# Patient Record
Sex: MALE | Race: WHITE | Employment: FULL TIME | URBAN - METROPOLITAN AREA
[De-identification: names, ages, dates, MRNs, and addresses within clinical notes are randomized per-mention and may not be internally consistent; named-entity substitution may affect disease eponyms.]

---

## 2019-06-07 PROBLEM — K42.9 UMBILICAL HERNIA WITHOUT OBSTRUCTION OR GANGRENE: Status: ACTIVE | Noted: 2019-06-07

## 2019-06-18 PROBLEM — Z09 S/P UMBILICAL HERNIA REPAIR, FOLLOW-UP EXAM: Status: ACTIVE | Noted: 2019-06-18

## 2019-07-18 PROBLEM — Z09 S/P UMBILICAL HERNIA REPAIR, FOLLOW-UP EXAM: Status: RESOLVED | Noted: 2019-06-18 | Resolved: 2019-07-18

## 2019-07-26 PROBLEM — G89.18 POST-OP PAIN: Status: ACTIVE | Noted: 2019-07-26

## 2019-08-17 PROBLEM — K21.9 GASTROESOPHAGEAL REFLUX DISEASE WITHOUT ESOPHAGITIS: Status: ACTIVE | Noted: 2019-08-17

## 2019-08-19 ENCOUNTER — HOSPITAL ENCOUNTER (EMERGENCY)
Age: 29
Discharge: HOME | End: 2019-08-20
Payer: COMMERCIAL

## 2019-08-19 DIAGNOSIS — R10.32 LEFT LOWER QUADRANT PAIN: Primary | ICD-10-CM

## 2019-08-19 DIAGNOSIS — R11.2 NAUSEA VOMITING AND DIARRHEA: ICD-10-CM

## 2019-08-19 DIAGNOSIS — R19.7 NAUSEA VOMITING AND DIARRHEA: ICD-10-CM

## 2019-08-19 LAB
ALBUMIN SERPL-MCNC: 4.5 GM/DL (ref 3.4–5)
ALP BLD-CCNC: 71 IU/L (ref 40–129)
ALT SERPL-CCNC: 42 U/L (ref 10–40)
ANION GAP SERPL CALCULATED.3IONS-SCNC: 12 MMOL/L (ref 4–16)
AST SERPL-CCNC: 28 IU/L (ref 15–37)
BASOPHILS ABSOLUTE: 0 K/CU MM
BASOPHILS RELATIVE PERCENT: 0.4 % (ref 0–1)
BILIRUB SERPL-MCNC: 0.3 MG/DL (ref 0–1)
BUN BLDV-MCNC: 15 MG/DL (ref 6–23)
CALCIUM SERPL-MCNC: 9.8 MG/DL (ref 8.3–10.6)
CHLORIDE BLD-SCNC: 98 MMOL/L (ref 99–110)
CO2: 25 MMOL/L (ref 21–32)
CREAT SERPL-MCNC: 0.9 MG/DL (ref 0.9–1.3)
DIFFERENTIAL TYPE: ABNORMAL
EOSINOPHILS ABSOLUTE: 0.1 K/CU MM
EOSINOPHILS RELATIVE PERCENT: 0.5 % (ref 0–3)
GFR AFRICAN AMERICAN: >60 ML/MIN/1.73M2
GFR NON-AFRICAN AMERICAN: >60 ML/MIN/1.73M2
GLUCOSE BLD-MCNC: 99 MG/DL (ref 70–99)
HCT VFR BLD CALC: 46.5 % (ref 42–52)
HEMOGLOBIN: 15.1 GM/DL (ref 13.5–18)
IMMATURE NEUTROPHIL %: 0.3 % (ref 0–0.43)
LIPASE: 26 IU/L (ref 13–60)
LYMPHOCYTES ABSOLUTE: 2 K/CU MM
LYMPHOCYTES RELATIVE PERCENT: 20.3 % (ref 24–44)
MCH RBC QN AUTO: 31.3 PG (ref 27–31)
MCHC RBC AUTO-ENTMCNC: 32.5 % (ref 32–36)
MCV RBC AUTO: 96.3 FL (ref 78–100)
MONOCYTES ABSOLUTE: 0.7 K/CU MM
MONOCYTES RELATIVE PERCENT: 7.5 % (ref 0–4)
NUCLEATED RBC %: 0 %
PDW BLD-RTO: 13 % (ref 11.7–14.9)
PLATELET # BLD: 242 K/CU MM (ref 140–440)
PMV BLD AUTO: 10.8 FL (ref 7.5–11.1)
POTASSIUM SERPL-SCNC: 4.2 MMOL/L (ref 3.5–5.1)
RBC # BLD: 4.83 M/CU MM (ref 4.6–6.2)
SEGMENTED NEUTROPHILS ABSOLUTE COUNT: 6.9 K/CU MM
SEGMENTED NEUTROPHILS RELATIVE PERCENT: 71 % (ref 36–66)
SODIUM BLD-SCNC: 135 MMOL/L (ref 135–145)
TOTAL IMMATURE NEUTOROPHIL: 0.03 K/CU MM
TOTAL NUCLEATED RBC: 0 K/CU MM
TOTAL PROTEIN: 7.2 GM/DL (ref 6.4–8.2)
WBC # BLD: 9.7 K/CU MM (ref 4–10.5)

## 2019-08-19 PROCEDURE — 36415 COLL VENOUS BLD VENIPUNCTURE: CPT

## 2019-08-19 PROCEDURE — 80053 COMPREHEN METABOLIC PANEL: CPT

## 2019-08-19 PROCEDURE — 83690 ASSAY OF LIPASE: CPT

## 2019-08-19 PROCEDURE — 85025 COMPLETE CBC W/AUTO DIFF WBC: CPT

## 2019-08-19 PROCEDURE — 99283 EMERGENCY DEPT VISIT LOW MDM: CPT

## 2019-08-19 PROCEDURE — 6370000000 HC RX 637 (ALT 250 FOR IP): Performed by: PHYSICIAN ASSISTANT

## 2019-08-19 RX ORDER — DICYCLOMINE HCL 20 MG
20 TABLET ORAL ONCE
Status: COMPLETED | OUTPATIENT
Start: 2019-08-19 | End: 2019-08-19

## 2019-08-19 RX ORDER — ONDANSETRON 4 MG/1
4 TABLET, ORALLY DISINTEGRATING ORAL EVERY 6 HOURS
Qty: 10 TABLET | Refills: 0 | Status: SHIPPED | OUTPATIENT
Start: 2019-08-19 | End: 2019-10-10

## 2019-08-19 RX ORDER — PROMETHAZINE HYDROCHLORIDE 25 MG/1
25 TABLET ORAL EVERY 6 HOURS PRN
Qty: 12 TABLET | Refills: 0 | Status: SHIPPED | OUTPATIENT
Start: 2019-08-19

## 2019-08-19 RX ORDER — ONDANSETRON 4 MG/1
4 TABLET, ORALLY DISINTEGRATING ORAL ONCE
Status: COMPLETED | OUTPATIENT
Start: 2019-08-19 | End: 2019-08-19

## 2019-08-19 RX ORDER — HYDROCODONE BITARTRATE AND ACETAMINOPHEN 5; 325 MG/1; MG/1
1 TABLET ORAL EVERY 6 HOURS PRN
Qty: 5 TABLET | Refills: 0 | Status: SHIPPED | OUTPATIENT
Start: 2019-08-19 | End: 2019-08-21

## 2019-08-19 RX ORDER — DICYCLOMINE HYDROCHLORIDE 10 MG/1
20 CAPSULE ORAL 4 TIMES DAILY PRN
Qty: 30 CAPSULE | Refills: 0 | Status: SHIPPED | OUTPATIENT
Start: 2019-08-19 | End: 2019-08-21 | Stop reason: SDUPTHER

## 2019-08-19 RX ADMIN — ONDANSETRON 4 MG: 4 TABLET, ORALLY DISINTEGRATING ORAL at 23:15

## 2019-08-19 RX ADMIN — DICYCLOMINE HYDROCHLORIDE 20 MG: 20 TABLET ORAL at 23:15

## 2019-08-19 ASSESSMENT — PAIN DESCRIPTION - PAIN TYPE: TYPE: ACUTE PAIN

## 2019-08-19 ASSESSMENT — PAIN DESCRIPTION - ORIENTATION: ORIENTATION: LEFT

## 2019-08-19 ASSESSMENT — PAIN DESCRIPTION - LOCATION: LOCATION: ABDOMEN

## 2019-08-19 ASSESSMENT — PAIN SCALES - GENERAL: PAINLEVEL_OUTOF10: 8

## 2019-08-20 VITALS
HEART RATE: 96 BPM | RESPIRATION RATE: 18 BRPM | OXYGEN SATURATION: 98 % | HEIGHT: 70 IN | TEMPERATURE: 98.2 F | BODY MASS INDEX: 29.49 KG/M2 | WEIGHT: 206 LBS | SYSTOLIC BLOOD PRESSURE: 128 MMHG | DIASTOLIC BLOOD PRESSURE: 91 MMHG

## 2019-08-20 NOTE — ED PROVIDER NOTES
Past Medical History:   Diagnosis Date    Reflux      Past Surgical History:   Procedure Laterality Date    HERNIA REPAIR      umbilical hernia repair (June 2019)  Dr. Ana Tran    Current Outpatient Rx   Medication Sig Dispense Refill    LORazepam (ATIVAN) 0.5 MG tablet TAKE 1-2 TABLETS BY MOUTH EVERY 8 HOURS AS NEEDED  2    pantoprazole sodium (PROTONIX) 40 MG PACK packet Take 40 mg by mouth every morning (before breakfast)      acetaminophen (TYLENOL) 500 MG tablet Take 1,000 mg by mouth every 6 hours as needed for Pain. ALLERGIES    No Known Allergies    SOCIAL AND FAMILY HISTORY    Social History     Socioeconomic History    Marital status: Single     Spouse name: None    Number of children: None    Years of education: None    Highest education level: None   Occupational History    None   Social Needs    Financial resource strain: None    Food insecurity:     Worry: None     Inability: None    Transportation needs:     Medical: None     Non-medical: None   Tobacco Use    Smoking status: Former Smoker     Packs/day: 0.50     Types: Cigarettes    Smokeless tobacco: Never Used    Tobacco comment: VAPE   Substance and Sexual Activity    Alcohol use: No    Drug use: No    Sexual activity: None   Lifestyle    Physical activity:     Days per week: None     Minutes per session: None    Stress: None   Relationships    Social connections:     Talks on phone: None     Gets together: None     Attends [employer] service: None     Active member of club or organization: None     Attends meetings of clubs or organizations: None     Relationship status: None    Intimate partner violence:     Fear of current or ex partner: None     Emotionally abused: None     Physically abused: None     Forced sexual activity: None   Other Topics Concern    None   Social History Narrative    None     History reviewed. No pertinent family history.     PHYSICAL EXAM    VITAL SIGNS: BP (!) 129/93   Pulse 116   Temp 98.2 °F (36.8 °C) (Oral)   Resp 18   Ht 5' 10\" (1.778 m)   Wt 206 lb (93.4 kg)   SpO2 98%   BMI 29.56 kg/m²   Constitutional:  Well developed, well nourished. No distress  Eyes:  Sclera nonicteric, conjunctiva moist  HENT:  Atraumatic. PERRL. EOMI.  moist mucus membranes. Neck/Lymphatics: supple, no JVD, no swollen nodes  Respiratory:  No retractions, no accessory muscle use, normal breath sounds   Cardiovascular:  Mild tachy rate, normal rhythm, no murmurs    GI:     No gross discoloration.       -no Landers's sign (periumbilical ecchymosis)       -no Grey-Archuleta's sign (flank ecchymosis)  (necrosis/hemmorrhage may cause subcutaneous blood leakage)    Bowel sounds present, no audible bruits. Soft,  No distention, no guarding, no rigidity,   + mild generalized lower and left quadrant abdominal tenderness, no rebound, no palpable pulsatile masses,  No focal or peritoneal findings   No McBurney's point tenderness   Negative Rovsing sign    Negative Gallo's sign. Back:   No CVA tenderness to percussion.   Musculoskeletal:  No edema, no deformity  Vascular: DP pulses 2+ equal bilaterally  Integument: No rash, dry skin  Neurologic:  Alert & oriented, normal speech  Psychiatric: Cooperative, pleasant affect       LABS:  Results for orders placed or performed during the hospital encounter of 08/19/19   CBC auto diff   Result Value Ref Range    WBC 9.7 4.0 - 10.5 K/CU MM    RBC 4.83 4.6 - 6.2 M/CU MM    Hemoglobin 15.1 13.5 - 18.0 GM/DL    Hematocrit 46.5 42 - 52 %    MCV 96.3 78 - 100 FL    MCH 31.3 (H) 27 - 31 PG    MCHC 32.5 32.0 - 36.0 %    RDW 13.0 11.7 - 14.9 %    Platelets 221 592 - 755 K/CU MM    MPV 10.8 7.5 - 11.1 FL    Differential Type AUTOMATED DIFFERENTIAL     Segs Relative 71.0 (H) 36 - 66 %    Lymphocytes % 20.3 (L) 24 - 44 %    Monocytes % 7.5 (H) 0 - 4 %    Eosinophils % 0.5 0 - 3 %    Basophils % 0.4 0 - 1 %    Segs Absolute 6.9 K/CU MM Lymphocytes # 2.0 K/CU MM    Monocytes # 0.7 K/CU MM    Eosinophils # 0.1 K/CU MM    Basophils # 0.0 K/CU MM    Nucleated RBC % 0.0 %    Total Nucleated RBC 0.0 K/CU MM    Total Immature Neutrophil 0.03 K/CU MM    Immature Neutrophil % 0.3 0 - 0.43 %   CMP   Result Value Ref Range    Sodium 135 135 - 145 MMOL/L    Potassium 4.2 3.5 - 5.1 MMOL/L    Chloride 98 (L) 99 - 110 mMol/L    CO2 25 21 - 32 MMOL/L    BUN 15 6 - 23 MG/DL    CREATININE 0.9 0.9 - 1.3 MG/DL    Glucose 99 70 - 99 MG/DL    Calcium 9.8 8.3 - 10.6 MG/DL    Alb 4.5 3.4 - 5.0 GM/DL    Total Protein 7.2 6.4 - 8.2 GM/DL    Total Bilirubin 0.3 0.0 - 1.0 MG/DL    ALT 42 (H) 10 - 40 U/L    AST 28 15 - 37 IU/L    Alkaline Phosphatase 71 40 - 129 IU/L    GFR Non-African American >60 >60 mL/min/1.73m2    GFR African American >60 >60 mL/min/1.73m2    Anion Gap 12 4 - 16   Lipase   Result Value Ref Range    Lipase 26 13 - 60 IU/L         ED COURSE & MEDICAL DECISION MAKING       Vital signs and nursing notes reviewed during ED course. Patient seen independently. Attending available throughout ED stay for consultation. All pertinent Lab data and radiographic results reviewed with patient at bedside. The patient and/or the family were informed of the results of any tests/labs/imaging, the treatment plan, and time was allotted to answer questions. Differential diagnosis: Abdominal Aortic Aneurysm, Ischemic Bowel, Bowel Obstruction, Acute Cholecystitis, Acute Appendicitis, other    Clinical  IMPRESSION    1. Left lower quadrant pain    2. Nausea vomiting and diarrhea        Patient presents as above. Has had continued abdominal pain since June. He has new vomiting and diarrhea over the last 4 days. He is also taking ciprofloxacin for possible colitis that was seen on an outpatient CT 2 Fridays ago.   Under patient's chart review I do find the following impression from a CAT scan from [facility] family medicine:  IMPRESSION:          Mild mucosal

## 2019-08-21 ENCOUNTER — APPOINTMENT (OUTPATIENT)
Dept: CT IMAGING | Age: 29
End: 2019-08-21
Payer: COMMERCIAL

## 2019-08-21 ENCOUNTER — HOSPITAL ENCOUNTER (EMERGENCY)
Age: 29
Discharge: HOME | End: 2019-08-21
Attending: EMERGENCY MEDICINE
Payer: COMMERCIAL

## 2019-08-21 VITALS
HEART RATE: 98 BPM | RESPIRATION RATE: 18 BRPM | OXYGEN SATURATION: 97 % | BODY MASS INDEX: 27.92 KG/M2 | SYSTOLIC BLOOD PRESSURE: 124 MMHG | WEIGHT: 195 LBS | DIASTOLIC BLOOD PRESSURE: 94 MMHG | HEIGHT: 70 IN | TEMPERATURE: 98.2 F

## 2019-08-21 DIAGNOSIS — R11.2 NON-INTRACTABLE VOMITING WITH NAUSEA, UNSPECIFIED VOMITING TYPE: ICD-10-CM

## 2019-08-21 DIAGNOSIS — R19.7 DIARRHEA, UNSPECIFIED TYPE: ICD-10-CM

## 2019-08-21 DIAGNOSIS — R10.32 ABDOMINAL PAIN, LEFT LOWER QUADRANT: Primary | ICD-10-CM

## 2019-08-21 LAB
ALBUMIN SERPL-MCNC: 4.2 GM/DL (ref 3.4–5)
ALP BLD-CCNC: 64 IU/L (ref 40–129)
ALT SERPL-CCNC: 42 U/L (ref 10–40)
ANION GAP SERPL CALCULATED.3IONS-SCNC: 12 MMOL/L (ref 4–16)
AST SERPL-CCNC: 25 IU/L (ref 15–37)
BASOPHILS ABSOLUTE: 0 K/CU MM
BASOPHILS RELATIVE PERCENT: 0.6 % (ref 0–1)
BILIRUB SERPL-MCNC: 0.3 MG/DL (ref 0–1)
BUN BLDV-MCNC: 13 MG/DL (ref 6–23)
CALCIUM SERPL-MCNC: 9 MG/DL (ref 8.3–10.6)
CHLORIDE BLD-SCNC: 104 MMOL/L (ref 99–110)
CO2: 24 MMOL/L (ref 21–32)
CREAT SERPL-MCNC: 0.8 MG/DL (ref 0.9–1.3)
DIFFERENTIAL TYPE: ABNORMAL
EOSINOPHILS ABSOLUTE: 0.1 K/CU MM
EOSINOPHILS RELATIVE PERCENT: 1 % (ref 0–3)
GFR AFRICAN AMERICAN: >60 ML/MIN/1.73M2
GFR NON-AFRICAN AMERICAN: >60 ML/MIN/1.73M2
GLUCOSE BLD-MCNC: 103 MG/DL (ref 70–99)
HCT VFR BLD CALC: 43.1 % (ref 42–52)
HEMOGLOBIN: 14 GM/DL (ref 13.5–18)
IMMATURE NEUTROPHIL %: 0.8 % (ref 0–0.43)
LIPASE: 28 IU/L (ref 13–60)
LYMPHOCYTES ABSOLUTE: 1.7 K/CU MM
LYMPHOCYTES RELATIVE PERCENT: 32.9 % (ref 24–44)
MCH RBC QN AUTO: 31 PG (ref 27–31)
MCHC RBC AUTO-ENTMCNC: 32.5 % (ref 32–36)
MCV RBC AUTO: 95.6 FL (ref 78–100)
MONOCYTES ABSOLUTE: 0.4 K/CU MM
MONOCYTES RELATIVE PERCENT: 8.3 % (ref 0–4)
NUCLEATED RBC %: 0 %
PDW BLD-RTO: 13.2 % (ref 11.7–14.9)
PLATELET # BLD: 215 K/CU MM (ref 140–440)
PMV BLD AUTO: 10.3 FL (ref 7.5–11.1)
POTASSIUM SERPL-SCNC: 4.5 MMOL/L (ref 3.5–5.1)
RBC # BLD: 4.51 M/CU MM (ref 4.6–6.2)
SEGMENTED NEUTROPHILS ABSOLUTE COUNT: 2.9 K/CU MM
SEGMENTED NEUTROPHILS RELATIVE PERCENT: 56.4 % (ref 36–66)
SODIUM BLD-SCNC: 140 MMOL/L (ref 135–145)
TOTAL IMMATURE NEUTOROPHIL: 0.04 K/CU MM
TOTAL NUCLEATED RBC: 0 K/CU MM
TOTAL PROTEIN: 6.9 GM/DL (ref 6.4–8.2)
WBC # BLD: 5.2 K/CU MM (ref 4–10.5)

## 2019-08-21 PROCEDURE — 99284 EMERGENCY DEPT VISIT MOD MDM: CPT

## 2019-08-21 PROCEDURE — 80053 COMPREHEN METABOLIC PANEL: CPT

## 2019-08-21 PROCEDURE — 36415 COLL VENOUS BLD VENIPUNCTURE: CPT

## 2019-08-21 PROCEDURE — 85025 COMPLETE CBC W/AUTO DIFF WBC: CPT

## 2019-08-21 PROCEDURE — 6360000002 HC RX W HCPCS: Performed by: PHYSICIAN ASSISTANT

## 2019-08-21 PROCEDURE — 96374 THER/PROPH/DIAG INJ IV PUSH: CPT

## 2019-08-21 PROCEDURE — 83690 ASSAY OF LIPASE: CPT

## 2019-08-21 PROCEDURE — 2580000003 HC RX 258: Performed by: PHYSICIAN ASSISTANT

## 2019-08-21 PROCEDURE — 6370000000 HC RX 637 (ALT 250 FOR IP): Performed by: PHYSICIAN ASSISTANT

## 2019-08-21 PROCEDURE — 96375 TX/PRO/DX INJ NEW DRUG ADDON: CPT

## 2019-08-21 RX ORDER — DICYCLOMINE HYDROCHLORIDE 10 MG/1
20 CAPSULE ORAL 4 TIMES DAILY PRN
Qty: 30 CAPSULE | Refills: 0 | Status: SHIPPED | OUTPATIENT
Start: 2019-08-21

## 2019-08-21 RX ORDER — SODIUM CHLORIDE 0.9 % (FLUSH) 0.9 %
10 SYRINGE (ML) INJECTION
Status: DISCONTINUED | OUTPATIENT
Start: 2019-08-21 | End: 2019-08-21 | Stop reason: HOSPADM

## 2019-08-21 RX ORDER — KETOROLAC TROMETHAMINE 30 MG/ML
15 INJECTION, SOLUTION INTRAMUSCULAR; INTRAVENOUS ONCE
Status: COMPLETED | OUTPATIENT
Start: 2019-08-21 | End: 2019-08-21

## 2019-08-21 RX ORDER — 0.9 % SODIUM CHLORIDE 0.9 %
1000 INTRAVENOUS SOLUTION INTRAVENOUS ONCE
Status: COMPLETED | OUTPATIENT
Start: 2019-08-21 | End: 2019-08-21

## 2019-08-21 RX ORDER — ONDANSETRON 2 MG/ML
4 INJECTION INTRAMUSCULAR; INTRAVENOUS ONCE
Status: COMPLETED | OUTPATIENT
Start: 2019-08-21 | End: 2019-08-21

## 2019-08-21 RX ADMIN — SODIUM CHLORIDE 1000 ML: 9 INJECTION, SOLUTION INTRAVENOUS at 15:57

## 2019-08-21 RX ADMIN — HYOSCYAMINE SULFATE 125 MCG: 0.12 TABLET ORAL; SUBLINGUAL at 15:57

## 2019-08-21 RX ADMIN — KETOROLAC TROMETHAMINE 15 MG: 30 INJECTION, SOLUTION INTRAMUSCULAR; INTRAVENOUS at 15:57

## 2019-08-21 RX ADMIN — ONDANSETRON 4 MG: 2 INJECTION INTRAMUSCULAR; INTRAVENOUS at 15:58

## 2019-08-21 ASSESSMENT — PAIN DESCRIPTION - PAIN TYPE: TYPE: ACUTE PAIN

## 2019-08-21 ASSESSMENT — PAIN DESCRIPTION - DESCRIPTORS: DESCRIPTORS: CRAMPING;DISCOMFORT;ACHING

## 2019-08-21 ASSESSMENT — PAIN DESCRIPTION - PROGRESSION: CLINICAL_PROGRESSION: NOT CHANGED

## 2019-08-21 ASSESSMENT — PAIN SCALES - GENERAL
PAINLEVEL_OUTOF10: 6
PAINLEVEL_OUTOF10: 6

## 2019-08-21 ASSESSMENT — PAIN DESCRIPTION - ONSET: ONSET: ON-GOING

## 2019-08-21 ASSESSMENT — PAIN DESCRIPTION - LOCATION: LOCATION: ABDOMEN

## 2019-08-21 ASSESSMENT — PAIN DESCRIPTION - FREQUENCY: FREQUENCY: INTERMITTENT

## 2019-08-21 NOTE — ED PROVIDER NOTES
eMERGENCY dEPARTMENT eNCOUnter      PCP: Anne Hernandez MD    CHIEF COMPLAINT    Chief Complaint   Patient presents with    Abdominal Pain     intermittent rated 6/10    Emesis     x 1 week    Nausea       HPI    Aileen Kirk is a 30 y.o. male with PSH of hernia repair in June 2019 by Dr. Sandra Zarco who presents with abdominal pain, nausea, vomiting, diarrhea. Onset was 1 week ago. Duration has been intermittent since the onset. Characteristic pain is described as burning and sharp. Patient reports associated subjective fever. Aggravating factors are moving from sitting to standing, going from standing to sitting, going from laying to sitting and mowing the lawn. Patient reports approximately 2 episodes of nonbloody, nonbilious emesis in the last 24 hours and multiple episodes of nonbloody diarrhea in the last 24 hours. Patient denies recent sick contacts and denies being around animals. Abdominal pain located in the left lower quadrant and hypogastric region. Abdominal pain does not radiate. Patient reports that he has tried Zofran and Phenergan as well as Norco with some improvement in symptoms. Patient has been able to keep down water and Pedialyte today but no solids. Patient reports recent CT scan showing \"infection in my colon\" on August 9th and patient reports that he was started on Cipro and Flagyl at that time. Patient reports he was seen in our ED 2 days ago and was treated with Phenergan, Zofran, and Norco.  Patient was also recently seen by his general surgeon, Dr. Matheus Hernández, on 8/14/2019 who referred him on to a GI specialist for further evaluation given patient's ongoing left lower quadrant abdominal pain since his surgery in June but patient has not yet heard from GI specialty regarding an appointment. Patient denies chest pain, shortness of breath, urinary symptoms, melena, hematochezia, hematemesis.     REVIEW OF SYSTEMS    Constitutional: + Subjective fever  HENT:  Denies >60 >60 mL/min/1.73m2    GFR African American >60 >60 mL/min/1.73m2    Anion Gap 12 4 - 16   Lipase   Result Value Ref Range    Lipase 28 13 - 60 IU/L                 ED COURSE & MEDICAL DECISION MAKING    Vital signs and nursing notes reviewed during ED course. Patient care and presentation staffed with and seen in conjunction with supervising physician, Dr. Adarsh Carrillo, today. Patient presents as above which prompted workup. Chart review of recent ED visit just a few days ago was performed today. Patient treated in the ED with IV fluids, IV Zofran, IV Toradol, and sublingual Levsin with improvement in nausea and resolution of tachycardia, but patient continues to have abdominal discomfort. Abdominal exam and repeat abdominal exam are without peritoneal signs. While in the ED today, labs were obtained. Labs without clinically significant derangements. No leukocytosis or leukopenia. Patient declines CT imaging of abdomen and pelvis while in the ED today reporting that since he had one on the ninth he does not want to be repeated because he is unsure if his insurance will pay for it or not. Patient reports that he feels like hydrocodone-acetaminophen is the only thing that helps his abdominal pain and request refill of this prescription. Patient and I discussed that I do not feel comfortable refilling this prescription as he is quite a ways out from surgery and feel this should come from either pain management or his PCP. Patient advised to follow-up with his general surgeon as needed and with GI physician at his scheduled appointment on 9/6/2019 for further evaluation of patient's pain. I have low clinical suspicion for emergent cause of patient's symptoms. All pertinent Lab data and radiographic results reviewed with patient at bedside. The patient and/or the family were informed of the results of any tests/labs/imaging, the treatment plan, and time was allotted to answer questions.    Diagnosis, disposition, and treatment plan reviewed with patient and/or family who understands and agrees. I estimate there is LOW risk for ACUTE APPENDICITIS, BOWEL OBSTRUCTION, CHOLECYSTITIS, RUPTURED DIVERTICULITIS, INCARCERATED HERNIA, HEMMORHAGIC PANCREATITIS, or PERFORATED BOWEL/ULCER, thus I consider the discharge disposition reasonable. Also, there is no evidence or peritonitis, sepsis, or toxicity. Roseanna Escobedos (or their surrogate) and I have discussed the diagnosis and risks, and we agree with discharging home with close follow-up. We also discussed returning to the Emergency Department immediately if new or worsening symptoms occur. We have discussed the symptoms which are most concerning that necessitate immediate return. Clinical  IMPRESSION    1. Abdominal pain, left lower quadrant    2. Non-intractable vomiting with nausea, unspecified vomiting type    3. Diarrhea, unspecified type        Disposition referral (if applicable):  Benedicto Greene MD  [facility] [id number] [facility]  [phone]    Call today  [address] in 2-3 days    Azar Bass MD  [address] [place] [address]  [phone]    Go to   Recheck at your scheduled appointment on 9/6/19    Titus Gomez MD  [address]  [address] [facility] [phone]    Call   As needed    [facility] Emergency Department  [facility] [phone]  Go to   Return to ED if symptoms worsen or new symptoms      Disposition medications (if applicable):  Discharge Medication List as of 8/21/2019  5:17 PM            Comment: Please note this report has been produced using speech recognition software and may contain errors related to that system including errors in grammar, punctuation, and spelling, as well as words and phrases that may be inappropriate.  If there are any questions or concerns please feel free to contact the dictating

## 2019-08-21 NOTE — ED PROVIDER NOTES
I independently examined and evaluated Soha Wiseman. In brief, patient with acute on chronic abdominal pain has seen general surgery and scheduled to see GI in a few weeks. Focused exam revealed diffuse tenderness no rebound guarding or rigidity. ED course: Labs sent results nonemergent patient declining imaging he will be discharged to continue outpatient work-up he understands and agrees return warnings given. All diagnostic, treatment, and disposition decisions were made by myself in conjunction with the advanced practice provider. For all further details of the patient's emergency department visit, please see the advanced practice provider's documentation. Comment: Please note this report has been produced using speech recognition software and may contain errors related to that system including errors in grammar, punctuation, and spelling, as well as words and phrases that may be inappropriate. If there are any questions or concerns please feel free to contact the dictating provider for clarification.         Sugey Vazquez MD  08/21/19 8882

## 2019-08-23 ENCOUNTER — HOSPITAL ENCOUNTER (EMERGENCY)
Age: 29
Discharge: HOME | End: 2019-08-23
Attending: EMERGENCY MEDICINE
Payer: COMMERCIAL

## 2019-08-23 VITALS
RESPIRATION RATE: 15 BRPM | HEART RATE: 97 BPM | SYSTOLIC BLOOD PRESSURE: 131 MMHG | OXYGEN SATURATION: 98 % | TEMPERATURE: 98.4 F | HEIGHT: 70 IN | DIASTOLIC BLOOD PRESSURE: 107 MMHG | WEIGHT: 200 LBS | BODY MASS INDEX: 28.63 KG/M2

## 2019-08-23 DIAGNOSIS — R11.2 NAUSEA VOMITING AND DIARRHEA: Primary | ICD-10-CM

## 2019-08-23 DIAGNOSIS — R10.32 LEFT LOWER QUADRANT PAIN: ICD-10-CM

## 2019-08-23 DIAGNOSIS — R19.7 NAUSEA VOMITING AND DIARRHEA: Primary | ICD-10-CM

## 2019-08-23 PROCEDURE — 99282 EMERGENCY DEPT VISIT SF MDM: CPT

## 2019-08-23 ASSESSMENT — PAIN SCALES - GENERAL: PAINLEVEL_OUTOF10: 5

## 2019-08-23 ASSESSMENT — PAIN DESCRIPTION - DESCRIPTORS: DESCRIPTORS: SHARP

## 2019-08-23 ASSESSMENT — PAIN DESCRIPTION - FREQUENCY: FREQUENCY: INTERMITTENT

## 2019-08-23 ASSESSMENT — PAIN DESCRIPTION - PAIN TYPE: TYPE: ACUTE PAIN

## 2019-08-23 ASSESSMENT — PAIN DESCRIPTION - LOCATION: LOCATION: ABDOMEN

## 2019-08-23 NOTE — ED PROVIDER NOTES
Smoker     Packs/day: 0.50     Types: Cigarettes    Smokeless tobacco: Never Used    Tobacco comment: VAPE   Substance and Sexual Activity    Alcohol use: No    Drug use: No    Sexual activity: Not on file   Lifestyle    Physical activity:     Days per week: Not on file     Minutes per session: Not on file    Stress: Not on file   Relationships    Social connections:     Talks on phone: Not on file     Gets together: Not on file     Attends Yazdanism service: Not on file     Active member of club or organization: Not on file     Attends meetings of clubs or organizations: Not on file     Relationship status: Not on file    Intimate partner violence:     Fear of current or ex partner: Not on file     Emotionally abused: Not on file     Physically abused: Not on file     Forced sexual activity: Not on file   Other Topics Concern    Not on file   Social History Narrative    Not on file     No current facility-administered medications for this encounter. Current Outpatient Medications   Medication Sig Dispense Refill    dicyclomine (BENTYL) 10 MG capsule Take 2 capsules by mouth 4 times daily as needed (abd pain) 30 capsule 0    ondansetron (ZOFRAN ODT) 4 MG disintegrating tablet Take 1 tablet by mouth every 6 hours 10 tablet 0    promethazine (PHENERGAN) 25 MG tablet Take 1 tablet by mouth every 6 hours as needed for Nausea 12 tablet 0    LORazepam (ATIVAN) 0.5 MG tablet TAKE 1-2 TABLETS BY MOUTH EVERY 8 HOURS AS NEEDED  2    pantoprazole sodium (PROTONIX) 40 MG PACK packet Take 40 mg by mouth every morning (before breakfast)      acetaminophen (TYLENOL) 500 MG tablet Take 1,000 mg by mouth every 6 hours as needed for Pain.        No Known Allergies    Nursing Notes Reviewed    Physical Exam:  ED Triage Vitals [08/23/19 1332]   Enc Vitals Group      BP (!) 143/98      Pulse 105      Resp 14      Temp 98.4 °F (36.9 °C)      Temp Source Oral      SpO2 97 %      Weight 200 lb (90.7 kg)      Height 5'

## 2019-08-25 PROBLEM — Z09 S/P UMBILICAL HERNIA REPAIR, FOLLOW-UP EXAM: Status: RESOLVED | Noted: 2019-06-18 | Resolved: 2019-08-25

## 2019-09-16 ENCOUNTER — HOSPITAL ENCOUNTER (OUTPATIENT)
Age: 29
Setting detail: SPECIMEN
Discharge: HOME | End: 2019-09-16
Payer: COMMERCIAL

## 2019-09-16 PROCEDURE — 83630 LACTOFERRIN FECAL (QUAL): CPT

## 2019-09-16 PROCEDURE — 87046 STOOL CULTR AEROBIC BACT EA: CPT

## 2019-09-16 PROCEDURE — 87209 SMEAR COMPLEX STAIN: CPT

## 2019-09-16 PROCEDURE — 87324 CLOSTRIDIUM AG IA: CPT

## 2019-09-16 PROCEDURE — 87045 FECES CULTURE AEROBIC BACT: CPT

## 2019-09-16 PROCEDURE — 87177 OVA AND PARASITES SMEARS: CPT

## 2019-09-17 LAB — LACTOFERRIN, QUAL: NEGATIVE

## 2019-09-18 LAB
CLOSTRIDIUM DIFFICILE, PCR: NORMAL
CLOSTRIDIUM DIFFICILE, PCR: NORMAL

## 2019-09-20 LAB
CULTURE: NORMAL
Lab: NORMAL
SPECIMEN: NORMAL

## 2019-09-21 LAB
OVA AND PARASITE WET MOUNT: NEGATIVE
OVA AND PARASITE WET MOUNT: NORMAL
TRICHROME SMEAR, STOOL O&P: NEGATIVE

## 2019-10-03 ENCOUNTER — HOSPITAL ENCOUNTER (OUTPATIENT)
Dept: ULTRASOUND IMAGING | Age: 29
Discharge: HOME | End: 2019-10-03
Payer: COMMERCIAL

## 2019-10-03 ENCOUNTER — HOSPITAL ENCOUNTER (OUTPATIENT)
Dept: GENERAL RADIOLOGY | Age: 29
Discharge: HOME | End: 2019-10-03
Payer: COMMERCIAL

## 2019-10-03 DIAGNOSIS — R10.9 STOMACH ACHE: ICD-10-CM

## 2019-10-03 DIAGNOSIS — R10.9 ABDOMINAL PAIN, UNSPECIFIED ABDOMINAL LOCATION: ICD-10-CM

## 2019-10-03 PROCEDURE — 74250 X-RAY XM SM INT 1CNTRST STD: CPT

## 2019-10-03 PROCEDURE — 76705 ECHO EXAM OF ABDOMEN: CPT

## 2019-10-10 ENCOUNTER — APPOINTMENT (OUTPATIENT)
Dept: GENERAL RADIOLOGY | Age: 29
End: 2019-10-10
Payer: COMMERCIAL

## 2019-10-10 ENCOUNTER — HOSPITAL ENCOUNTER (EMERGENCY)
Age: 29
Discharge: HOME | End: 2019-10-10
Payer: COMMERCIAL

## 2019-10-10 VITALS
WEIGHT: 210 LBS | HEIGHT: 70 IN | HEART RATE: 107 BPM | RESPIRATION RATE: 16 BRPM | TEMPERATURE: 98 F | OXYGEN SATURATION: 96 % | DIASTOLIC BLOOD PRESSURE: 81 MMHG | BODY MASS INDEX: 30.06 KG/M2 | SYSTOLIC BLOOD PRESSURE: 117 MMHG

## 2019-10-10 DIAGNOSIS — K21.9 GASTROESOPHAGEAL REFLUX DISEASE WITHOUT ESOPHAGITIS: ICD-10-CM

## 2019-10-10 DIAGNOSIS — R07.9 CHEST PAIN, UNSPECIFIED TYPE: Primary | ICD-10-CM

## 2019-10-10 LAB
ALBUMIN SERPL-MCNC: 4.6 GM/DL (ref 3.4–5)
ALP BLD-CCNC: 81 IU/L (ref 40–128)
ALT SERPL-CCNC: 47 U/L (ref 10–40)
ANION GAP SERPL CALCULATED.3IONS-SCNC: 15 MMOL/L (ref 4–16)
AST SERPL-CCNC: 34 IU/L (ref 15–37)
BASOPHILS ABSOLUTE: 0.1 K/CU MM
BASOPHILS RELATIVE PERCENT: 1.1 % (ref 0–1)
BILIRUB SERPL-MCNC: 0.6 MG/DL (ref 0–1)
BUN BLDV-MCNC: 17 MG/DL (ref 6–23)
CALCIUM SERPL-MCNC: 9.3 MG/DL (ref 8.3–10.6)
CHLORIDE BLD-SCNC: 100 MMOL/L (ref 99–110)
CO2: 23 MMOL/L (ref 21–32)
CREAT SERPL-MCNC: 0.9 MG/DL (ref 0.9–1.3)
DIFFERENTIAL TYPE: ABNORMAL
EOSINOPHILS ABSOLUTE: 0.1 K/CU MM
EOSINOPHILS RELATIVE PERCENT: 0.8 % (ref 0–3)
GFR AFRICAN AMERICAN: >60 ML/MIN/1.73M2
GFR NON-AFRICAN AMERICAN: >60 ML/MIN/1.73M2
GLUCOSE BLD-MCNC: 108 MG/DL (ref 70–99)
HCT VFR BLD CALC: 46.9 % (ref 42–52)
HEMOGLOBIN: 15.2 GM/DL (ref 13.5–18)
IMMATURE NEUTROPHIL %: 0.5 % (ref 0–0.43)
LIPASE: 32 IU/L (ref 13–60)
LYMPHOCYTES ABSOLUTE: 1.2 K/CU MM
LYMPHOCYTES RELATIVE PERCENT: 18.1 % (ref 24–44)
MCH RBC QN AUTO: 31.6 PG (ref 27–31)
MCHC RBC AUTO-ENTMCNC: 32.4 % (ref 32–36)
MCV RBC AUTO: 97.5 FL (ref 78–100)
MONOCYTES ABSOLUTE: 0.8 K/CU MM
MONOCYTES RELATIVE PERCENT: 12.2 % (ref 0–4)
NUCLEATED RBC %: 0 %
PDW BLD-RTO: 12.9 % (ref 11.7–14.9)
PLATELET # BLD: 205 K/CU MM (ref 140–440)
PMV BLD AUTO: 11 FL (ref 7.5–11.1)
POTASSIUM SERPL-SCNC: 3.7 MMOL/L (ref 3.5–5.1)
RBC # BLD: 4.81 M/CU MM (ref 4.6–6.2)
SEGMENTED NEUTROPHILS ABSOLUTE COUNT: 4.3 K/CU MM
SEGMENTED NEUTROPHILS RELATIVE PERCENT: 67.3 % (ref 36–66)
SODIUM BLD-SCNC: 138 MMOL/L (ref 135–145)
TOTAL IMMATURE NEUTOROPHIL: 0.03 K/CU MM
TOTAL NUCLEATED RBC: 0 K/CU MM
TOTAL PROTEIN: 7.4 GM/DL (ref 6.4–8.2)
TOTAL RETICULOCYTE COUNT: 0.1 K/CU MM
TROPONIN T: <0.01 NG/ML
WBC # BLD: 6.4 K/CU MM (ref 4–10.5)

## 2019-10-10 PROCEDURE — 99285 EMERGENCY DEPT VISIT HI MDM: CPT

## 2019-10-10 PROCEDURE — 83690 ASSAY OF LIPASE: CPT

## 2019-10-10 PROCEDURE — 6360000002 HC RX W HCPCS: Performed by: PHYSICIAN ASSISTANT

## 2019-10-10 PROCEDURE — 6370000000 HC RX 637 (ALT 250 FOR IP): Performed by: PHYSICIAN ASSISTANT

## 2019-10-10 PROCEDURE — 96374 THER/PROPH/DIAG INJ IV PUSH: CPT

## 2019-10-10 PROCEDURE — 84484 ASSAY OF TROPONIN QUANT: CPT

## 2019-10-10 PROCEDURE — 93010 ELECTROCARDIOGRAM REPORT: CPT | Performed by: INTERNAL MEDICINE

## 2019-10-10 PROCEDURE — 85025 COMPLETE CBC W/AUTO DIFF WBC: CPT

## 2019-10-10 PROCEDURE — 93005 ELECTROCARDIOGRAM TRACING: CPT | Performed by: PHYSICIAN ASSISTANT

## 2019-10-10 PROCEDURE — 71045 X-RAY EXAM CHEST 1 VIEW: CPT

## 2019-10-10 PROCEDURE — 96375 TX/PRO/DX INJ NEW DRUG ADDON: CPT

## 2019-10-10 PROCEDURE — 80053 COMPREHEN METABOLIC PANEL: CPT

## 2019-10-10 PROCEDURE — C9113 INJ PANTOPRAZOLE SODIUM, VIA: HCPCS | Performed by: PHYSICIAN ASSISTANT

## 2019-10-10 RX ORDER — PANTOPRAZOLE SODIUM 40 MG/10ML
40 INJECTION, POWDER, LYOPHILIZED, FOR SOLUTION INTRAVENOUS ONCE
Status: COMPLETED | OUTPATIENT
Start: 2019-10-10 | End: 2019-10-10

## 2019-10-10 RX ORDER — MAGNESIUM HYDROXIDE/ALUMINUM HYDROXICE/SIMETHICONE 120; 1200; 1200 MG/30ML; MG/30ML; MG/30ML
30 SUSPENSION ORAL ONCE
Status: COMPLETED | OUTPATIENT
Start: 2019-10-10 | End: 2019-10-10

## 2019-10-10 RX ORDER — LIDOCAINE HYDROCHLORIDE 20 MG/ML
15 SOLUTION OROPHARYNGEAL ONCE
Status: COMPLETED | OUTPATIENT
Start: 2019-10-10 | End: 2019-10-10

## 2019-10-10 RX ORDER — ONDANSETRON 4 MG/1
4 TABLET, ORALLY DISINTEGRATING ORAL ONCE
Status: COMPLETED | OUTPATIENT
Start: 2019-10-10 | End: 2019-10-10

## 2019-10-10 RX ORDER — ASPIRIN 81 MG/1
324 TABLET, CHEWABLE ORAL ONCE
Status: COMPLETED | OUTPATIENT
Start: 2019-10-10 | End: 2019-10-10

## 2019-10-10 RX ORDER — MORPHINE SULFATE 4 MG/ML
4 INJECTION, SOLUTION INTRAMUSCULAR; INTRAVENOUS ONCE
Status: COMPLETED | OUTPATIENT
Start: 2019-10-10 | End: 2019-10-10

## 2019-10-10 RX ORDER — SUCRALFATE 1 G/1
1 TABLET ORAL 4 TIMES DAILY
Qty: 30 TABLET | Refills: 0 | Status: SHIPPED | OUTPATIENT
Start: 2019-10-10

## 2019-10-10 RX ORDER — FAMOTIDINE 20 MG/1
20 TABLET, FILM COATED ORAL 2 TIMES DAILY
Qty: 20 TABLET | Refills: 0 | Status: SHIPPED | OUTPATIENT
Start: 2019-10-10

## 2019-10-10 RX ORDER — KETOROLAC TROMETHAMINE 30 MG/ML
30 INJECTION, SOLUTION INTRAMUSCULAR; INTRAVENOUS ONCE
Status: COMPLETED | OUTPATIENT
Start: 2019-10-10 | End: 2019-10-10

## 2019-10-10 RX ORDER — ONDANSETRON 4 MG/1
4 TABLET, ORALLY DISINTEGRATING ORAL EVERY 6 HOURS
Qty: 10 TABLET | Refills: 0 | Status: SHIPPED | OUTPATIENT
Start: 2019-10-10

## 2019-10-10 RX ADMIN — LIDOCAINE HYDROCHLORIDE 15 ML: 20 SOLUTION ORAL; TOPICAL at 07:11

## 2019-10-10 RX ADMIN — PANTOPRAZOLE SODIUM 40 MG: 40 INJECTION, POWDER, FOR SOLUTION INTRAVENOUS at 08:18

## 2019-10-10 RX ADMIN — ALUMINUM HYDROXIDE, MAGNESIUM HYDROXIDE, AND SIMETHICONE 30 ML: 200; 200; 20 SUSPENSION ORAL at 07:11

## 2019-10-10 RX ADMIN — ONDANSETRON 4 MG: 4 TABLET, ORALLY DISINTEGRATING ORAL at 07:11

## 2019-10-10 RX ADMIN — KETOROLAC TROMETHAMINE 30 MG: 30 INJECTION, SOLUTION INTRAMUSCULAR at 08:18

## 2019-10-10 RX ADMIN — MORPHINE SULFATE 4 MG: 4 INJECTION, SOLUTION INTRAMUSCULAR; INTRAVENOUS at 08:18

## 2019-10-10 RX ADMIN — ASPIRIN 81 MG 324 MG: 81 TABLET ORAL at 07:11

## 2019-10-10 ASSESSMENT — HEART SCORE: ECG: 0

## 2019-10-10 ASSESSMENT — PAIN DESCRIPTION - LOCATION
LOCATION: CHEST
LOCATION: CHEST

## 2019-10-10 ASSESSMENT — PAIN DESCRIPTION - PAIN TYPE
TYPE: ACUTE PAIN
TYPE: ACUTE PAIN

## 2019-10-10 ASSESSMENT — PAIN DESCRIPTION - DESCRIPTORS: DESCRIPTORS: SQUEEZING;SHARP

## 2019-10-10 ASSESSMENT — PAIN SCALES - GENERAL
PAINLEVEL_OUTOF10: 7
PAINLEVEL_OUTOF10: 4
PAINLEVEL_OUTOF10: 7

## 2019-10-10 ASSESSMENT — PAIN DESCRIPTION - ORIENTATION: ORIENTATION: MID

## 2019-10-10 ASSESSMENT — PAIN DESCRIPTION - FREQUENCY: FREQUENCY: CONTINUOUS

## 2019-10-11 LAB
EKG ATRIAL RATE: 93 BPM
EKG DIAGNOSIS: NORMAL
EKG P AXIS: 26 DEGREES
EKG P-R INTERVAL: 140 MS
EKG Q-T INTERVAL: 348 MS
EKG QRS DURATION: 74 MS
EKG QTC CALCULATION (BAZETT): 432 MS
EKG R AXIS: 34 DEGREES
EKG T AXIS: 35 DEGREES
EKG VENTRICULAR RATE: 93 BPM

## 2021-09-02 ENCOUNTER — HOSPITAL ENCOUNTER (OUTPATIENT)
Dept: PHYSICAL THERAPY | Age: 31
Setting detail: THERAPIES SERIES
Discharge: HOME | End: 2021-09-02
Payer: COMMERCIAL

## 2021-09-02 PROCEDURE — 97110 THERAPEUTIC EXERCISES: CPT

## 2021-09-02 PROCEDURE — 97016 VASOPNEUMATIC DEVICE THERAPY: CPT

## 2021-09-02 PROCEDURE — 97161 PT EVAL LOW COMPLEX 20 MIN: CPT

## 2021-09-02 ASSESSMENT — PAIN - FUNCTIONAL ASSESSMENT: PAIN_FUNCTIONAL_ASSESSMENT: PREVENTS OR INTERFERES SOME ACTIVE ACTIVITIES AND ADLS

## 2021-09-02 ASSESSMENT — PAIN DESCRIPTION - ORIENTATION: ORIENTATION: LEFT

## 2021-09-02 ASSESSMENT — PAIN SCALES - GENERAL: PAINLEVEL_OUTOF10: 3

## 2021-09-02 ASSESSMENT — PAIN DESCRIPTION - FREQUENCY: FREQUENCY: CONTINUOUS

## 2021-09-02 ASSESSMENT — PAIN DESCRIPTION - PROGRESSION: CLINICAL_PROGRESSION: GRADUALLY IMPROVING

## 2021-09-02 ASSESSMENT — PAIN DESCRIPTION - PAIN TYPE: TYPE: SURGICAL PAIN

## 2021-09-02 ASSESSMENT — PAIN DESCRIPTION - DESCRIPTORS: DESCRIPTORS: SHARP;ACHING

## 2021-09-02 ASSESSMENT — PAIN DESCRIPTION - LOCATION: LOCATION: KNEE

## 2021-09-02 NOTE — FLOWSHEET NOTE
old male s/p L knee ACL reconstructions with tibialis anterior allograft  8/11/21. Pt now has difficulties completing all general mobility, transfers and inability to complete closed chain activities including stairs and prolonged weightbearing activities without pain. Pt demo deficits this date that include pain, quad activation, flexibility restrictions and weakness.  Issued handout for exercises to complete at home. Pt will benefit with PT services with strength/ROM, gait training, manual and modalities to maximize function. Pt prior to onset of current condition had no pain with able to complete full ADLs and sports. Patient received education on their current pathology and how their condition effects them with their functional activities. Patient understood discussion and questions were answered. Patient understands their activity limitations and understands rational for treatment progression. Subjective:  See fab         Any changes in Ambulatory Summary Sheet?   None        Objective:  See fab   COVID screening questions were asked and patient attested that there had been no contact or symptoms       Russian stim if quad activation poor         Exercises: (No more than 4 columns) WBAT  Exercise/Equipment 9/2/21  #1 Date Date              WARM UP            Nu step                    TABLE         *Quad sets 10x2  3\"        *Ankle pumps  20x2       *Sitting AAROM knee flexion stretch with use of R LE  10x  5\"       *Heel prop 20-30\" x5       *gastroc stretching   20\"x4       heelslides with SB                     STANDING         L LE hip ext/ABD         Mini lunge/squats                                                               PROPRIOCEPTION         wobbleboard                                                 MODALITIES         Vaso  10'                       Other Therapeutic Activities/Education:  Patient received education on their current pathology and how their condition effects them with their functional activities. Patient understood discussion and questions were answered. Patient understands their activity limitations and understands rational for treatment progression.         Home Exercise Program:  HO issued, reviewed and discussed with patient. Pt agreed to comply.          Manual Treatments:          Modalities:  Gameready to L knee in supine with towel support, low pressure, 34 deg x10' for pain management. No adverse effects.          Communication with other providers:  POC sent 9/2/21        Assessment:  (Response towards treatment session) (Pain Rating)     Pt is 14year old female s/p L knee ACL reconstructions with hamstring autograft 8/25/21. Pt now has difficulties completing all general mobility, transfers and inability to complete closed chain activities including stairs and prolonged weightbearing activities without pain. Pt demo deficits this date that include pain, quad activation, flexibility restrictions and weakness. Issued handout for exercises to complete at home. Pt will benefit with PT services with strength/ROM, gait training, manual and modalities to maximize function. Pt prior to onset of current condition had no pain with able to complete full ADLs and sports. Patient received education on their current pathology and how their condition effects them with their functional activities. Patient understood discussion and questions were answered. Patient understands their activity limitations and understands rational for treatment progression.     Plan for Next Session: Specific instructions for Next Treatment: review HEP, quad activation, PROM knee flex/ext to end ranges, gastric stretching, hip strengthening as able, gameready.  See protocol.        Time In / Time Out:  0720-4197        If [facility] Please Indicate Time In/Out/Total Time  CPT Code Time in Time out Total Time                                                                         Total for session              Timed Code/Total Treatment Minutes:  15/55'   15' TE, 1 PT eval, 10' vaso         Next Progress Note due:  Eval 9/2/21  Visit 10         Plan of Care Interventions:  [x]? Therapeutic Exercise                     [x]? Modalities:  [x]? Therapeutic Activity                                   []? Ultrasound              [x]? Estim  [x]? Gait Training                                              []? Cervical Traction    []? Lumbar Traction  [x]? Neuromuscular Re-education                    [x]? Cold/hotpack          []? Iontophoresis           [x]? Instruction in HEP                                      [x]? Vasopneumatic      []? Dry Needling    [x]?  Manual Therapy                                                  []? Aquatic Therapy                              Electronically signed by:  Jewel Dyson, PT, DPT, OCS  9/2/2021, 8:58 AM    9/2/2021 8:58 AM

## 2021-09-02 NOTE — PLAN OF CARE
Outpatient Physical Therapy           [place]           [] Phone: [phone]   Fax: [phone]  [facility]           [] Phone: [phone]   Fax: [phone]     To: Referring Practitioner: Dr. Marquis Quevedo    From: Karlene Nugent, PT, ,DPT, OCS    Patient: Malcom Abreu        : 1990  Diagnosis: Diagnosis: L ACL repair with tibialis anterior allograft  19   Treatment Diagnosis: Treatment Diagnosis: L knee pain, weakness, stiffness, gait dysfunction. Date: 2021    Physical Therapy Certification/Re-Certification Form  Dear Dr. Marquis Quevedo  The following patient has been evaluated for physical therapy services and for therapy to continue, insurance requires physician review of the treatment plan initially and every 90 days. Please review the attached evaluation and/or summary of the patient's plan of care, and verify that you agree therapy should continue by signing the attached document and sending it back to our office. Assessment:      Pt is 33year old male s/p L knee ACL reconstructions with tibialis anterior allograft  21. Pt now has difficulties completing all general mobility, transfers and inability to complete closed chain activities including stairs and prolonged weightbearing activities without pain. Pt demo deficits this date that include pain, quad activation, flexibility restrictions and weakness.  Issued handout for exercises to complete at home. Pt will benefit with PT services with strength/ROM, gait training, manual and modalities to maximize function. Pt prior to onset of current condition had no pain with able to complete full ADLs and sports. Patient received education on their current pathology and how their condition effects them with their functional activities. Patient understood discussion and questions were answered. Patient understands their activity limitations and understands rational for treatment progression.     Plan of Care/Treatment to date:  [x] hesitate to call.   Thank you for your referral.      Physician Signature:________________________________Date:_________ TIME: _____  By signing above, therapists plan is approved by physician

## 2021-09-02 NOTE — PROGRESS NOTES
of Transportation: Car  Type of occupation: [employer] in [address]: fishing, jogging    Objective     Observation/Palpation  Palpation: min tenderness into calf, distal quad  Observation: Entered with single crutch with brace,    AROM RLE (degrees)  RLE AROM: WNL  RLE General AROM: 0-134 deg knee ext/flex  PROM LLE (degrees)  LLE General PROM: same as AROM  AROM LLE (degrees)  LLE General AROM: -2 deg to 100 deg knee ext/flex secondary to pain and stiffness. Joint Mobility  ROM LLE: Min hypomobility into the knee with effusion noted. Strength RLE  Strength RLE: WNL  Comment: 5/5 in all directions. Strength LLE  Comment: Pain/weakenss limited with L knee flex/ext and weakness into the hip. L Hip Flexion: 3-/5  L Hip Extension: 3+/5  L Hip ABduction: 4-/5  L Hip ADduction: 4-/5  L Hip Internal Rotation: 4-/5  L Hip External Rotation: 4-/5  L Knee Flexion: 3-/5  L Knee Extension: 3-/5     Additional Measures  Other: LEFS:      Balance  Single Leg Stance R Le  Single Leg Stance L Le  Comments: Limited due to pain and weakness with L SLS. Assessment   Assessment   Conditions Requiring Skilled Therapeutic Intervention  Body structures, Functions, Activity limitations: Decreased functional mobility ; Decreased ROM; Decreased strength;Decreased endurance;Decreased balance; Increased pain  Pt is 33year old male s/p L knee ACL reconstructions with tibialis anterior allograft  21. Pt now has difficulties completing all general mobility, transfers and inability to complete closed chain activities including stairs and prolonged weightbearing activities without pain. Pt demo deficits this date that include pain, quad activation, flexibility restrictions and weakness. Issued handout for exercises to complete at home. Pt will benefit with PT services with strength/ROM, gait training, manual and modalities to maximize function.  Pt prior to onset of current condition had no pain with able to complete full ADLs and sports. Patient received education on their current pathology and how their condition effects them with their functional activities. Patient understood discussion and questions were answered. Patient understands their activity limitations and understands rational for treatment progression. Treatment Diagnosis: L knee pain, weakness, stiffness, gait dysfunction  Prognosis: Good  Decision Making: Low Complexity  Barriers to Learning: None  REQUIRES PT FOLLOW UP: Yes  Activity Tolerance  Activity Tolerance: Patient Tolerated treatment well      Plan   Plan  Times per week: 2  Plan weeks: 12  Specific instructions for Next Treatment: review HEP, quad activation, PROM knee flex/ext to end ranges, gastric stretching, hip strengthening as able, gameready. See protocol. Current Treatment Recommendations: Strengthening, ROM, Neuromuscular Re-education, Home Exercise Program, Manual Therapy - Soft Tissue Mobilization, Modalities         Goals  Short term goals  Time Frame for Short term goals: 6 weeks 10/16/21  Short term goal 1: Pt will demo appropriate transition with WB onto LE with brace as able. Short term goal 2: Pt will demo full quad set with WB onto L LE without increase in pain. Short term goal 3: Pt will demo  to >90 deg knee AROM with no increase in pain to return to normal mechanics. Short term goal 4: Pt will demo nomal gait mechanics with/without brace to ease functional mobility. Short term goal 5: Pt will demo LEFS>40/80  to dmeo improved function. Long term goals  Time Frame for Long term goals : 12 weeks 12/2/21  Long term goal 1: Pt will demo I with HEP/symptom management. Long term goal 2: Pt will demo full knee AROM with no increase in pain to ease transfers. Long term goal 3: Pt will demo initiate jogging with normal mechanics with min/no pain. Long term goal 4: Pt will demo 5/5 knee flex/ext strength to ease stairs.   Long term goal 5: Pt will demo LEFS >70/80 per LEFS to demo improved function. Patient Goals   Patient goals : return to normal activities and return to work.      Zana Recinos PT, DPT, OCS    9/2/2021 8:54 AM

## 2021-09-07 ENCOUNTER — HOSPITAL ENCOUNTER (OUTPATIENT)
Dept: PHYSICAL THERAPY | Age: 31
Setting detail: THERAPIES SERIES
Discharge: HOME | End: 2021-09-07
Payer: COMMERCIAL

## 2021-09-07 PROCEDURE — 97016 VASOPNEUMATIC DEVICE THERAPY: CPT

## 2021-09-07 PROCEDURE — 97110 THERAPEUTIC EXERCISES: CPT

## 2021-09-07 NOTE — FLOWSHEET NOTE
Patients Plan of Care was received and signed. Signed POC was scanned and placed in the patients chart.     Kerline Song

## 2021-09-07 NOTE — FLOWSHEET NOTE
Outpatient Physical Therapy  [place]           [x] Phone: [phone]   Fax: [phone]  [place]           [] Phone: [phone]   Fax: [phone]        Physical Therapy Daily Treatment Note  Date:  2021    Patient Name:  Tray Blum    :  1990  MRN: [id number]  Restrictions/Precautions:   WBAT, hold LAQ/SAQ  Diagnosis:   Diagnosis: L ACL repair with tibialis anterior allograft  21  Date of Injury/Surgery:   Treatment Diagnosis: Treatment Diagnosis: L knee pain, weakness, stiffness, gait dysfunction. Insurance/Certification information: PT Insurance Information: [employer]   Referring Physician:  Referring Practitioner: Dr. Ambika Reyes  Next Doctor Visit:    Plan of care signed (Y/N):  N, sent 21    Outcome Measure: LEFS  Visit# / total visits:    per POC  Pain level: 5/10 Sharp medial patella  Goals:     Patient goals : return to work or previous function. Short term goals  Time Frame for Short term goals: 6 weeks 10/16/21  Short term goal 1: Pt will demo appropriate transition with WB onto LE with brace as able. Short term goal 2: Pt will demo full quad set with WB onto L LE without increase in pain. Short term goal 3: Pt will demo  >110 deg knee AROM with no increase in pain to return to normal mechanics. Short term goal 4: Pt will demo nomal gait mechanics with/without brace to ease functional mobility. Short term goal 5: Pt will demo LEFS>50/80  to dmeo improved function. Long term goals  Time Frame for Long term goals : 12 weeks 21  Long term goal 1: Pt will demo I with HEP/symptom management. Long term goal 2: Pt will demo full knee AROM with no increase in pain to ease transfers. Long term goal 3: Pt will demo initiate jogging with normal mechanics with min/no pain. Long term goal 4: Pt will demo 5/5 knee flex/ext strength to ease stairs. Long term goal 5: Pt will demo LEFS >70/80 per LEFS to demo improved function.     Summary of Evaluation:     Pt PZ 76 LVSE old male s/p L knee ACL reconstructions with tibialis anterior allograft  8/11/21. Pt now has difficulties completing all general mobility, transfers and inability to complete closed chain activities including stairs and prolonged weightbearing activities without pain. Pt demo deficits this date that include pain, quad activation, flexibility restrictions and weakness.  Issued handout for exercises to complete at home. Pt will benefit with PT services with strength/ROM, gait training, manual and modalities to maximize function. Pt prior to onset of current condition had no pain with able to complete full ADLs and sports. Patient received education on their current pathology and how their condition effects them with their functional activities. Patient understood discussion and questions were answered. Patient understands their activity limitations and understands rational for treatment progression. Subjective:  Pt reports sharp pain inner knee. He is still walking with the crutch at home. He feels like his knee will give out and get wobbly. Any changes in Ambulatory Summary Sheet? None        Objective:     COVID screening questions were asked and patient attested that there had been no contact or symptoms    Ambulation with crutch on R side at arrival no Heel strike and toe off.  110 dg pain free on SB  0 dg ext     Russian stim if quad activation poor         Exercises: (No more than 4 columns) WBAT  Exercise/Equipment 9/2/21  #1 9/7/21 #2 Date     hold LAQ/SAQ         WARM UP            Nu step                    TABLE         *Quad sets 10x2  3\"   10x2  3\"      *Ankle pumps  20x2 20x2     *Sitting AAROM knee flexion stretch with use of R LE  10x  5\"       *Heel prop 20-30\" x5  30 s x 5     *gastroc stretching   20\"x4       heelslides with SB    x 20     SLR    x 10     STANDING         L LE hip ext/ABD    x 20 ea.  R/L     Mini lunge/squats    x15                gait training quad cane    Focus on Heel-toe  Performed well with cues.                                       PROPRIOCEPTION         wobbleboard                                                 MODALITIES         Vaso  10'  10'                     Other Therapeutic Activities/Education:  Patient received education on their current pathology and how their condition effects them with their functional activities. Patient understood discussion and questions were answered. Patient understands their activity limitations and understands rational for treatment progression.         Home Exercise Program:  HO issued, reviewed and discussed with patient. Pt agreed to comply. 9/7/21 SLR, quad set, mini squat, Standing hip abd/ext     Manual Treatments:          Modalities:  Gameready to L knee in supine with towel support, low pressure, 34 deg x10' for pain management. No adverse effects.          Communication with other providers:  POC sent 9/2/21        Assessment:  (Response towards treatment session) (Pain Rating)  Pt demonstrated GOOD tolerance to tx with added exercises. Updated HEP. Pt would continue to benefit from skilled therapy interventions to address remaining impairments, improve mobility and strength and progress toward goal completion while reducing risk for re-injury or further decline. 5/10 post tx frozen post vaso     Pt is 14year old female s/p L knee ACL reconstructions with hamstring autograft 8/25/21. Pt now has difficulties completing all general mobility, transfers and inability to complete closed chain activities including stairs and prolonged weightbearing activities without pain. Pt demo deficits this date that include pain, quad activation, flexibility restrictions and weakness. Issued handout for exercises to complete at home. Pt will benefit with PT services with strength/ROM, gait training, manual and modalities to maximize function.  Pt prior to onset of current condition had no pain with able to complete full ADLs and sports. Patient received education on their current pathology and how their condition effects them with their functional activities. Patient understood discussion and questions were answered. Patient understands their activity limitations and understands rational for treatment progression.     Plan for Next Session: Specific instructions for Next Treatment: review HEP, quad activation, PROM knee flex/ext to end ranges, gastric stretching, hip strengthening as able, gameready. See protocol.        Time In / Time Out:  4788-6807       Timed Code/Total Treatment Minutes:  29'/39' 2 TE 1 Vaso         Next Progress Note due:  Eval 9/2/21  Visit 10         Plan of Care Interventions:  [x]? Therapeutic Exercise                     [x]? Modalities:  [x]? Therapeutic Activity                                   []? Ultrasound              [x]? Estim  [x]? Gait Training                                              []? Cervical Traction    []? Lumbar Traction  [x]? Neuromuscular Re-education                    [x]? Cold/hotpack          []? Iontophoresis           [x]? Instruction in HEP                                     [x]? Vasopneumatic      []? Dry Needling    [x]?  Manual Therapy                                                  []? Aquatic Therapy                              Electronically signed by:  Bj Horn PTA, CLT 9/7/21

## 2021-09-09 ENCOUNTER — HOSPITAL ENCOUNTER (OUTPATIENT)
Dept: PHYSICAL THERAPY | Age: 31
Discharge: HOME | End: 2021-09-09

## 2021-09-09 NOTE — FLOWSHEET NOTE
Physical Therapy  Cancellation/No-show Note  Patient Name:  Shauna Dee  :  1990   Date:  2021  Cancelled visits to date: 1  No-shows to date: 0    For today's appointment patient:  []  Cancelled  []  Rescheduled appointment  []  No-show     Reason given by patient:  []  Patient ill  []  Conflicting appointment  []  No transportation    []  Conflict with work  []  No reason given  [x]  Other:     Comments:   His leg is really swollen and he can't walk on it    Electronically signed by:  Aishwarya Chaudhry PTA, CLT  2021, 9:18 AM

## 2021-09-16 ENCOUNTER — HOSPITAL ENCOUNTER (OUTPATIENT)
Dept: PHYSICAL THERAPY | Age: 31
Setting detail: THERAPIES SERIES
Discharge: HOME | End: 2021-09-16
Payer: COMMERCIAL

## 2021-09-16 PROCEDURE — 97110 THERAPEUTIC EXERCISES: CPT

## 2021-09-16 PROCEDURE — 97016 VASOPNEUMATIC DEVICE THERAPY: CPT

## 2021-09-16 NOTE — FLOWSHEET NOTE
Outpatient Physical Therapy  [place]           [x] Phone: [phone]   Fax: [phone]  [facility]           [] Phone: [phone]   Fax: [phone]        Physical Therapy Daily Treatment Note  Date:  2021    Patient Name:  Nova Jones    :  1990  MRN: [id number]  Restrictions/Precautions:   WBAT, hold LAQ/SAQ  Diagnosis:   Diagnosis: L ACL repair with tibialis anterior allograft  21  Date of Injury/Surgery:   Treatment Diagnosis: Treatment Diagnosis: L knee pain, weakness, stiffness, gait dysfunction. Insurance/Certification information: PT Insurance Information: R   Referring Physician:  Referring Practitioner: Dr. Teddy Kumar  Next Doctor Visit:    Plan of care signed (Y/N):  N, sent 21    Outcome Measure: LEFS  Visit# / total visits:   3/24 per POC  Pain level: 4-5/10   Goals:     Patient goals : return to work or previous function. Short term goals  Time Frame for Short term goals: 6 weeks 10/16/21  Short term goal 1: Pt will demo appropriate transition with WB onto LE with brace as able. Short term goal 2: Pt will demo full quad set with WB onto L LE without increase in pain. Short term goal 3: Pt will demo  >110 deg knee AROM with no increase in pain to return to normal mechanics. Short term goal 4: Pt will demo nomal gait mechanics with/without brace to ease functional mobility. Short term goal 5: Pt will demo LEFS>50/80  to dmeo improved function. Long term goals  Time Frame for Long term goals : 12 weeks 21  Long term goal 1: Pt will demo I with HEP/symptom management. Long term goal 2: Pt will demo full knee AROM with no increase in pain to ease transfers. Long term goal 3: Pt will demo initiate jogging with normal mechanics with min/no pain. Long term goal 4: Pt will demo 5/5 knee flex/ext strength to ease stairs. Long term goal 5: Pt will demo LEFS >70/80 per LEFS to demo improved function.     Summary of Evaluation:     Pt is 31 year old male s/p L knee ACL reconstructions with tibialis anterior allograft  8/11/21. Pt now has difficulties completing all general mobility, transfers and inability to complete closed chain activities including stairs and prolonged weightbearing activities without pain. Pt demo deficits this date that include pain, quad activation, flexibility restrictions and weakness.  Issued handout for exercises to complete at home. Pt will benefit with PT services with strength/ROM, gait training, manual and modalities to maximize function. Pt prior to onset of current condition had no pain with able to complete full ADLs and sports. Patient received education on their current pathology and how their condition effects them with their functional activities. Patient understood discussion and questions were answered. Patient understands their activity limitations and understands rational for treatment progression. Subjective:  Pt states he is doing much better since his last visit. Did have [address] for blood clot that came back negative. Pt also states he is not a tibialis anterior allograft. States they used Cadaver. Swelling is improving. Any changes in Ambulatory Summary Sheet? None        Objective:     COVID screening questions were asked and patient attested that there had been no contact or symptoms    Pt came in today ambulating with single crutch.   116 deg with minimal discomfort on SB  0 deg ext     Russian stim if quad activation poor         Exercises: (No more than 4 columns) WBAT  Exercise/Equipment 9/2/21  #1 9/7/21 #2 Date 9/16/21 #3     hold LAQ/SAQ         WARM UP            Nu step       WL 3 x 5 min             TABLE         *Quad sets 10x2  3\"   10x2  3\"   10x2  3\"    *Ankle pumps  20x2 20x2  HEP   *Sitting AAROM knee flexion stretch with use of R LE  10x  5\"    HEP   *Heel prop 20-30\" x5  30 s x 5  30 sec x 3   *gastroc stretching   20\"x4       heelslides with SB    x 20  2x10   SLR    x 10  2x10 sidelying hip abd   2x10   Prone hip ext   2x10   STANDING         L LE hip ext/ABD    x 20 ea. R/L  reviewed with min cueing for posture   Mini lunge/squats    x15   x 15 mini squat              gait training quad cane    Focus on Heel-toe  Performed well with cues.                                       PROPRIOCEPTION         wobbleboard                                                 MODALITIES         Vaso  10'  10'  10'                   Other Therapeutic Activities/Education:  Patient received education on their current pathology and how their condition effects them with their functional activities. Patient understood discussion and questions were answered. Patient understands their activity limitations and understands rational for treatment progression.         Home Exercise Program:  HO issued, reviewed and discussed with patient. Pt agreed to comply. 9/7/21 SLR, quad set, mini squat, Standing hip abd/ext     Manual Treatments:          Modalities:  Gameready to L knee in supine with pillow support, low pressure, 34 deg x10' for pain management. No adverse effects.          Communication with other providers:  POC sent 9/2/21        Assessment:  (Response towards treatment session) (Pain Rating)  Pt demonstrated overall good tolerance to today's session without adverse reaction. Quad tone improving but fatigues quickly. Pt would continue to benefit from skilled therapy interventions to address remaining impairments, improve mobility and strength and progress toward goal completion while reducing risk for re-injury or further decline. End pain: 3/10     Pt is 33year old male  s/p L knee ACL reconstructions with hamstring autograft 8/25/21. Pt now has difficulties completing all general mobility, transfers and inability to complete closed chain activities including stairs and prolonged weightbearing activities without pain.  Pt demo deficits this date that include pain, quad activation, flexibility restrictions and weakness. Issued handout for exercises to complete at home. Pt will benefit with PT services with strength/ROM, gait training, manual and modalities to maximize function. Pt prior to onset of current condition had no pain with able to complete full ADLs and sports. Patient received education on their current pathology and how their condition effects them with their functional activities. Patient understood discussion and questions were answered. Patient understands their activity limitations and understands rational for treatment progression.     Plan for Next Session: Specific instructions for Next Treatment: review HEP, quad activation, PROM knee flex/ext to end ranges, gastric stretching, hip strengthening as able, gameready. See protocol.        Time In / Time Out:  1521/1605       Timed Code/Total Treatment Minutes:  34'/44' 2 TE 1 Vaso         Next Progress Note due:  Derian 9/2/21  Visit 10         Plan of Care Interventions:  [x]? Therapeutic Exercise                     [x]? Modalities:  [x]? Therapeutic Activity                                   []? Ultrasound              [x]? Estim  [x]? Gait Training                                              []? Cervical Traction    []? Lumbar Traction  [x]? Neuromuscular Re-education                    [x]? Cold/hotpack          []? Iontophoresis           [x]? Instruction in HEP                                     [x]? Vasopneumatic      []? Dry Needling    [x]?  Manual Therapy                                                  []? Aquatic Therapy                              Electronically signed by: Diane Colunga 9/16/2021, 3:21 PM

## 2021-09-21 ENCOUNTER — HOSPITAL ENCOUNTER (OUTPATIENT)
Dept: PHYSICAL THERAPY | Age: 31
Setting detail: THERAPIES SERIES
Discharge: HOME | End: 2021-09-21
Payer: COMMERCIAL

## 2021-09-21 PROCEDURE — 97110 THERAPEUTIC EXERCISES: CPT

## 2021-09-21 PROCEDURE — 97112 NEUROMUSCULAR REEDUCATION: CPT

## 2021-09-21 PROCEDURE — 97016 VASOPNEUMATIC DEVICE THERAPY: CPT

## 2021-09-21 NOTE — FLOWSHEET NOTE
Outpatient Physical Therapy  [place]           [x] Phone: [phone]   Fax: [phone]  Angela price           [] Phone: [phone]   Fax: [phone]        Physical Therapy Daily Treatment Note  Date:  2021    Patient Name:  Satya Cano    :  1990  MRN: [id number]  Restrictions/Precautions:   WBAT, hold LAQ/SAQ  Diagnosis:   Diagnosis: L ACL repair with tibialis anterior allograft  21  Date of Injury/Surgery:   Treatment Diagnosis: Treatment Diagnosis: L knee pain, weakness, stiffness, gait dysfunction. Insurance/Certification information: PT Insurance Information: [facility]   Referring Physician:  Referring Practitioner: Dr. Hayes Bush  Next Doctor Visit:    Plan of care signed (Y/N):  N, sent 21    Outcome Measure: LEFS  Visit# / total visits:    per POC  Pain level: 3/10   Goals:     Patient goals : return to work or previous function. Short term goals  Time Frame for Short term goals: 6 weeks 10/16/21  Short term goal 1: Pt will demo appropriate transition with WB onto LE with brace as able. Short term goal 2: Pt will demo full quad set with WB onto L LE without increase in pain. Short term goal 3: Pt will demo  >110 deg knee AROM with no increase in pain to return to normal mechanics. Short term goal 4: Pt will demo nomal gait mechanics with/without brace to ease functional mobility. Short term goal 5: Pt will demo LEFS>50/80  to dmeo improved function. Long term goals  Time Frame for Long term goals : 12 weeks 21  Long term goal 1: Pt will demo I with HEP/symptom management. Long term goal 2: Pt will demo full knee AROM with no increase in pain to ease transfers. Long term goal 3: Pt will demo initiate jogging with normal mechanics with min/no pain. Long term goal 4: Pt will demo 5/5 knee flex/ext strength to ease stairs. Long term goal 5: Pt will demo LEFS >70/80 per LEFS to demo improved function.     Summary of Evaluation:     Pt is 31 year old male s/p L knee ACL reconstructions with tibialis anterior allograft  8/11/21. Pt now has difficulties completing all general mobility, transfers and inability to complete closed chain activities including stairs and prolonged weightbearing activities without pain. Pt demo deficits this date that include pain, quad activation, flexibility restrictions and weakness.  Issued handout for exercises to complete at home. Pt will benefit with PT services with strength/ROM, gait training, manual and modalities to maximize function. Pt prior to onset of current condition had no pain with able to complete full ADLs and sports. Patient received education on their current pathology and how their condition effects them with their functional activities. Patient understood discussion and questions were answered. Patient understands their activity limitations and understands rational for treatment progression. Subjective:  Pt states he did well after previous session. States he has not used crutch for about the last day and a half. Is scheduled to return to surgeon tomorrow. Any changes in Ambulatory Summary Sheet?   None        Objective:     COVID screening questions were asked and patient attested that there had been no contact or symptoms    Pt came in today ambulating without crutch  120 deg with minimal discomfort on SB  Full knee ext   Quad tone improving       Exercises: (No more than 4 columns) WBAT  Exercise/Equipment 9/2/21  #1 9/7/21 #2 Date 9/16/21 #3 Date 9/21/21 #4     hold LAQ/SAQ          WARM UP             Nu step       WL 3 x 5 min WL 3 x 5 min              TABLE          *Quad sets 10x2  3\"   10x2  3\"   10x2  3\"  With quad stim x 10 min   *Ankle pumps  20x2 20x2  HEP    *Sitting AAROM knee flexion stretch with use of R LE  10x  5\"    HEP    *Heel prop 20-30\" x5  30 s x 5  30 sec x 3  x 1 min   *gastroc stretching   20\"x4        heelslides with SB    x 20  2x10 2x10   SLR    x 10  2x10 3x10 sidelying hip abd   2x10 2x10   Prone hip ext   2x10 2x10   STANDING          L LE hip ext/ABD    x 20 ea. R/L  reviewed with min cueing for posture    Mini lunge/squats    x15   x 15 mini squat 2x10 mini squat holding onto counter    heelraises       20x    gait training quad cane    Focus on Heel-toe  Performed well with cues.   Gt training without A device                                        PROPRIOCEPTION          wobbleboard       F/B, S/S x 30 sec hold                                               MODALITIES          Vaso  10'  10'  10' 10'                    Other Therapeutic Activities/Education:  Patient received education on their current pathology and how their condition effects them with their functional activities. Patient understood discussion and questions were answered. Patient understands their activity limitations and understands rational for treatment progression.         Home Exercise Program:  HO issued, reviewed and discussed with patient. Pt agreed to comply. 9/7/21 SLR, quad set, mini squat, Standing hip abd/ext     Manual Treatments:          Modalities:  Gameready to L knee in supine with pillow support, low pressure, 34 deg x10' for pain management. No adverse effects.     Quad stim 2500HZ, 10/10, x 10 min while performing quad set.     Communication with other providers:  POC sent 9/2/21        Assessment:  (Response towards treatment session) (Pain Rating)  Pt demonstrated overall good tolerance to today's session without adverse reaction. Quad tone improving. Pt would continue to benefit from skilled therapy interventions to address remaining impairments, improve mobility and strength and progress toward goal completion while reducing risk for re-injury or further decline. End pain: 1-2/10     Pt is 33year old male  s/p L knee ACL reconstructions with hamstring autograft 8/25/21.  Pt now has difficulties completing all general mobility, transfers and inability to complete

## 2021-09-23 ENCOUNTER — HOSPITAL ENCOUNTER (OUTPATIENT)
Dept: PHYSICAL THERAPY | Age: 31
Discharge: HOME | End: 2021-09-23

## 2021-09-23 NOTE — FLOWSHEET NOTE
Physical Therapy  Cancellation/No-show Note  Patient Name:  Hollis Collier  :  1990   Date:  2021  Cancelled visits to date: 1  No-shows to date: 0    For today's appointment patient:  [x]  Cancelled  []  Rescheduled appointment  []  No-show     Reason given by patient:  []  Patient ill  [x]  Conflicting appointment with physician  []  No transportation    []  Conflict with work  []  No reason given  []  Other:     Comments:       Electronically signed by:  John Wilkes  2021, 3:00 PM

## 2021-09-28 ENCOUNTER — HOSPITAL ENCOUNTER (OUTPATIENT)
Dept: PHYSICAL THERAPY | Age: 31
Setting detail: THERAPIES SERIES
Discharge: HOME | End: 2021-09-28
Payer: COMMERCIAL

## 2021-09-28 PROCEDURE — 97016 VASOPNEUMATIC DEVICE THERAPY: CPT

## 2021-09-28 PROCEDURE — 97110 THERAPEUTIC EXERCISES: CPT

## 2021-09-28 PROCEDURE — 97530 THERAPEUTIC ACTIVITIES: CPT

## 2021-09-28 NOTE — FLOWSHEET NOTE
Outpatient Physical Therapy  [place]           [x] Phone: [phone]   Fax: [phone]  Heather Hooper           [] Phone: [phone]   Fax: [phone]        Physical Therapy Daily Treatment Note  Date:  2021    Patient Name:  Owen Gaming    :  1990  MRN: [id number]  Restrictions/Precautions:   WBAT, hold LAQ/SAQ  Diagnosis:   Diagnosis: L ACL repair with tibialis anterior allograft  21  Date of Injury/Surgery:   Treatment Diagnosis: Treatment Diagnosis: L knee pain, weakness, stiffness, gait dysfunction. Insurance/Certification information: PT Insurance Information: [employer]   Referring Physician:  Referring Practitioner: Dr. Rudy Collet  Next Doctor Visit:    Plan of care signed (Y/N):  N, sent 21    Outcome Measure: LEFS  Visit# / total visits:    per POC  Pain level: 3/10   Goals:     Patient goals : return to work or previous function. Short term goals  Time Frame for Short term goals: 6 weeks 10/16/21  Short term goal 1: Pt will demo appropriate transition with WB onto LE with brace as able. Short term goal 2: Pt will demo full quad set with WB onto L LE without increase in pain. Short term goal 3: Pt will demo  >110 deg knee AROM with no increase in pain to return to normal mechanics. Short term goal 4: Pt will demo nomal gait mechanics with/without brace to ease functional mobility. Short term goal 5: Pt will demo LEFS>50/80  to dmeo improved function. Long term goals  Time Frame for Long term goals : 12 weeks 21  Long term goal 1: Pt will demo I with HEP/symptom management. Long term goal 2: Pt will demo full knee AROM with no increase in pain to ease transfers. Long term goal 3: Pt will demo initiate jogging with normal mechanics with min/no pain. Long term goal 4: Pt will demo 5/5 knee flex/ext strength to ease stairs. Long term goal 5: Pt will demo LEFS >70/80 per LEFS to demo improved function.     Summary of Evaluation:     Pt is 31 year old male s/p L knee ACL reconstructions with tibialis anterior allograft  8/11/21. Pt now has difficulties completing all general mobility, transfers and inability to complete closed chain activities including stairs and prolonged weightbearing activities without pain. Pt demo deficits this date that include pain, quad activation, flexibility restrictions and weakness.  Issued handout for exercises to complete at home. Pt will benefit with PT services with strength/ROM, gait training, manual and modalities to maximize function. Pt prior to onset of current condition had no pain with able to complete full ADLs and sports. Patient received education on their current pathology and how their condition effects them with their functional activities. Patient understood discussion and questions were answered. Patient understands their activity limitations and understands rational for treatment progression. Subjective:  Pt went back to surgeon last week and they noted he shouldn't return to work until at least Oct 18th and to continue with PT. Returns to MD again in 6 weeks. Any changes in Ambulatory Summary Sheet?   None      Objective:     COVID screening questions were asked and patient attested that there had been no contact or symptoms    AROM knee flex: 123 deg     Exercises: (No more than 4 columns) WBAT  Exercise/Equipment Date 9/16/21 #3 Date 9/21/21 #4 9/28/21 #5     hold LAQ/SAQ       WARM UP          Nu step   WL 3 x 5 min WL 3 x 5 min L4 x5'           TABLE       *Quad sets  10x2  3\"  With quad stim x 10 min X10, 5\"   *Ankle pumps   HEP     *Sitting AAROM knee flexion stretch with use of R LE   HEP     *Heel prop  30 sec x 3  x 1 min    *gastroc stretching        heelslides with SB  2x10 2x10    SLR  2x10 3x10 2x10   sidelying hip abd 2x10 2x10 2x10   Prone hip ext 2x10 2x10 2x10   STANDING       L LE hip ext/ABD  reviewed with min cueing for posture     Mini lunge/squats   x 15 mini squat 2x10 mini squat holding onto counter x10 STS no UE support w/ eccentric focus    heelraises   20x     gait training quad cane   Gt training without A device     TKE    RTB x10  GTB x10    step ups    4\" x10  6\" x10 unilateral support    Stair completion for cues on proper form x2               PROPRIOCEPTION       wobbleboard   F/B, S/S x 30 sec hold                                    MODALITIES       Vaso   10' 10' 10'                 Other Therapeutic Activities/Education:  HEP, nonreciprocal pattern descending stairs to reduce potential buckling or injury     Home Exercise Program:  HO issued, reviewed and discussed with patient. Pt agreed to comply. 9/7/21 SLR, quad set, mini squat, Standing hip abd/ext     Manual Treatments:  none     Modalities:  Gameready to L knee in supine, low pressure, 34 deg x10' for pain management. No adverse effects.         Communication with other providers:  POC sent 9/2/21     Assessment:  Pt tolerated today's treatment without any adverse reactions or complications this date. Pt did well with added exercises this date, no increase in pain. Slightly unstable stepping down and was educated to continue nonreciprocal pattern for now descending to reduce injury until strength improves. Pt would continue to benefit from skilled therapy interventions to address remaining impairments, improve mobility and strength and progress toward goal completion while reducing risk for re-injury or further decline. End pain: 2/10     Plan for Next Session: Specific instructions for Next Treatment: review HEP, quad activation, PROM knee flex/ext to end ranges, gastric stretching, hip strengthening as able, gameready. See protocol.        Time In / Time Out:  1067 - 0256       Timed Code/Total Treatment Minutes:  38'/48': 10' vaso x1, 11' TA x1, 27' TE x2        Next Progress Note due:  Derian 9/2/21  Visit 10         Plan of Care Interventions:  [x]? Therapeutic Exercise                     [x]? Modalities:  [x]? Therapeutic Activity                                   []? Ultrasound              [x]? Estim  [x]? Gait Training                                              []? Cervical Traction    []? Lumbar Traction  [x]? Neuromuscular Re-education                    [x]? Cold/hotpack          []? Iontophoresis           [x]? Instruction in HEP                                     [x]? Vasopneumatic      []? Dry Needling    [x]?  Manual Therapy                                                  []? Aquatic Therapy                              Electronically signed by: Michelle Jovel PT,DPT 9/28/2021, 7:10 AM

## 2021-09-30 ENCOUNTER — HOSPITAL ENCOUNTER (OUTPATIENT)
Dept: PHYSICAL THERAPY | Age: 31
Discharge: HOME | End: 2021-09-30

## 2021-09-30 NOTE — FLOWSHEET NOTE
Physical Therapy  Cancellation/No-show Note  Patient Name:  Donte Felton  :  1990   Date:  2021  Cancelled visits to date: 2  No-shows to date: 0    For today's appointment patient:  [x]  Cancelled  []  Rescheduled appointment  []  No-show     Reason given by patient:  []  Patient ill  [x]  Conflicting appointment    []  No transportation    []  Conflict with work  []  No reason given  []  Other:     Comments:       Electronically signed by:  Leo Calderón, PT, DPT, OCS   2021, 2:25 PM    2021 2:25 PM

## 2021-10-05 ENCOUNTER — HOSPITAL ENCOUNTER (OUTPATIENT)
Dept: PHYSICAL THERAPY | Age: 31
Setting detail: THERAPIES SERIES
Discharge: HOME | End: 2021-10-05
Payer: COMMERCIAL

## 2021-10-05 PROCEDURE — 97112 NEUROMUSCULAR REEDUCATION: CPT

## 2021-10-05 PROCEDURE — 97110 THERAPEUTIC EXERCISES: CPT

## 2021-10-05 PROCEDURE — 97016 VASOPNEUMATIC DEVICE THERAPY: CPT

## 2021-10-05 NOTE — FLOWSHEET NOTE
Outpatient Physical Therapy  [place]           [x] Phone: [phone]   Fax: [phone]  Earlene Ya           [] Phone: [phone]   Fax: [phone]        Physical Therapy Daily Treatment Note  Date:  10/5/2021    Patient Name:  Mariangel Garcia    :  1990  MRN: [id number]  Restrictions/Precautions:   WBAT  Diagnosis:   Diagnosis: L ACL repair with tibialis anterior allograft     Date of Injury/Surgery: 21  Treatment Diagnosis: Treatment Diagnosis: L knee pain, weakness, stiffness, gait dysfunction. Insurance/Certification information: PT Insurance Information: [employer]   Referring Physician:  Referring Practitioner: Dr. Kori Benítez  Next Doctor Visit:    Plan of care signed (Y/N):  Yes   Outcome Measure: LEFS  Visit# / total visits:    per POC  Pain level:  1.5/10   Goals:     Patient goals : return to work or previous function. Short term goals  Time Frame for Short term goals: 6 weeks 10/16/21  Short term goal 1: Pt will demo appropriate transition with WB onto LE with brace as able. MET  Short term goal 2: Pt will demo full quad set with WB onto L LE without increase in pain. MET  Short term goal 3: Pt will demo  >110 deg knee AROM with no increase in pain to return to normal mechanics. MET   Short term goal 4: Pt will demo nomal gait mechanics with/without brace to ease functional mobility. MET  Short term goal 5: Pt will demo LEFS>50/80  to dmeo improved function. Long term goals  Time Frame for Long term goals : 12 weeks 21  Long term goal 1: Pt will demo I with HEP/symptom management. Long term goal 2: Pt will demo full knee AROM with no increase in pain to ease transfers. Long term goal 3: Pt will demo initiate jogging with normal mechanics with min/no pain. Long term goal 4: Pt will demo 5/5 knee flex/ext strength to ease stairs. Long term goal 5: Pt will demo LEFS >70/80 per LEFS to demo improved function.     Summary of Evaluation:     Pt is 31 year old male s/p L knee ACL reconstructions with tibialis anterior allograft  8/11/21. Pt now has difficulties completing all general mobility, transfers and inability to complete closed chain activities including stairs and prolonged weightbearing activities without pain. Pt demo deficits this date that include pain, quad activation, flexibility restrictions and weakness.  Issued handout for exercises to complete at home. Pt will benefit with PT services with strength/ROM, gait training, manual and modalities to maximize function. Pt prior to onset of current condition had no pain with able to complete full ADLs and sports. Patient received education on their current pathology and how their condition effects them with their functional activities. Patient understood discussion and questions were answered. Patient understands their activity limitations and understands rational for treatment progression. Subjective:  Pt doing fine functionally with soreness if on his feet all day. Planned return to work Oct 18th. No issues after last visit. Any changes in Ambulatory Summary Sheet?   None      Objective:     COVID screening questions were asked and patient attested that there had been no contact or symptoms  Normal gait without brace  Full knee AR OM   Challenged with uneven surfaces with ankle reaction to recover for balance  Good technique with step ups with anterior knee discomfort.        Exercises: (No more than 4 columns) WBAT  Exercise/Equipment Date 9/16/21 #3 Date 9/21/21 #4 9/28/21 #5 10/5/21  #6              WARM UP           Nu step   WL 3 x 5 min WL 3 x 5 min L4 x5'     R bike      5'    TABLE        *Quad sets  10x2  3\"  With quad stim x 10 min X10, 5\"    *Ankle pumps   HEP      *Sitting AAROM knee flexion stretch with use of R LE   HEP      *Heel prop  30 sec x 3  x 1 min     *gastroc stretching         heelslides with SB  2x10 2x10  10x2  GSB 10x2   SLR  2x10 3x10 2x10 6# 10x2   sidelying hip abd 2x10 2x10 2x10    Prone hip ext 2x10 2x10 2x10    Prone quad stretch        Bridge with hamstring curl     GSB 10x2    Stool Drags     B LE 10x2   STANDING        L LE hip ext/ABD  reviewed with min cueing for posture      Mini lunge/squats   x 15 mini squat 2x10 mini squat holding onto counter x10 STS no UE support w/ eccentric focus     heelraises   20x      gait training quad cane   Gt training without A device      TKE    RTB x10  GTB x10    Shuttle Leg Press    L LE 2c 10, 3c 10x2    step ups    4\" x10  6\" x10 unilateral support    Stair completion for cues on proper form x2 6in 20x2         FM bwd stepping      23# 5x   PROPRIOCEPTION        wobbleboard   F/B, S/S x 30 sec hold  20\"x3    L SLS on BOSU     10\"x4    BOSU marches      30x2    Min squat on BOSU     10x2             MODALITIES        Vaso   10' 10' 10' 10'                  Other Therapeutic Activities/Education:  HEP, nonreciprocal pattern descending stairs to reduce potential buckling or injury     Home Exercise Program:  HO issued, reviewed and discussed with patient. Pt agreed to comply. 9/7/21 SLR, quad set, mini squat, Standing hip abd/ext     Manual Treatments:  none     Modalities:  Gameready to L knee in supine, low pressure, 34 deg x10' for pain management. No adverse effects.         Communication with other providers:  POC sent 9/2/21     Assessment:  Pt tolerated today's treatment without any adverse reactions or complications this date. Pt fatigues with closed chain activities but able to complete as well with neuro activities with improvement with reps and confidence. ROM and pain progressing well. Strength deficits will compromise ability to return to work, will be focus to assist with expected return in 2 weeks.  Pt would continue to benefit from skilled therapy interventions to address remaining impairments, improve mobility and strength and progress toward goal completion while reducing risk for re-injury or further decline. End pain: 1/10     Plan for Next Session: Specific instructions for Next Treatment:  Closed chain quad activation, neuro activities as tolerated, gameready. See protocol.        Time In / Time Out:  2275- 8877       Timed Code/Total Treatment Minutes:  40/50'      10' vaso x1, 12'  Neuro   , 28' TE         Next Progress Note due:  Derian 9/2/21  Visit 10         Plan of Care Interventions:  [x]? Therapeutic Exercise                     [x]? Modalities:  [x]? Therapeutic Activity                                   []? Ultrasound              [x]? Estim  [x]? Gait Training                                              []? Cervical Traction    []? Lumbar Traction  [x]? Neuromuscular Re-education                    [x]? Cold/hotpack          []? Iontophoresis           [x]? Instruction in HEP                                     [x]? Vasopneumatic      []? Dry Needling    [x]?  Manual Therapy                                                  []? Aquatic Therapy                              Electronically signed by: Radha Montaño PT,DPT, OCS  10/5/2021, 7:53 AM     10/5/2021 7:54 AM

## 2021-10-07 ENCOUNTER — HOSPITAL ENCOUNTER (OUTPATIENT)
Dept: PHYSICAL THERAPY | Age: 31
Setting detail: THERAPIES SERIES
Discharge: HOME | End: 2021-10-07
Payer: COMMERCIAL

## 2021-10-07 PROCEDURE — 97016 VASOPNEUMATIC DEVICE THERAPY: CPT

## 2021-10-07 PROCEDURE — 97110 THERAPEUTIC EXERCISES: CPT

## 2021-10-07 PROCEDURE — 97112 NEUROMUSCULAR REEDUCATION: CPT

## 2021-10-07 NOTE — FLOWSHEET NOTE
Outpatient Physical Therapy  [place]           [x] Phone: [phone]   Fax: [phone]  [facility]           [] Phone: [phone]   Fax: [phone]        Physical Therapy Daily Treatment Note  Date:  10/7/2021    Patient Name:  Chi Perez    :  1990  MRN: [id number]  Restrictions/Precautions:   WBAT  Diagnosis:   Diagnosis: L ACL repair with tibialis anterior allograft     Date of Injury/Surgery: 21  Treatment Diagnosis: Treatment Diagnosis: L knee pain, weakness, stiffness, gait dysfunction. Insurance/Certification information: PT Insurance Information: UMR   Referring Physician:  Referring Practitioner: Dr. Jhonny Cahvarria  Next Doctor Visit:    Plan of care signed (Y/N):  Yes   Outcome Measure: LEFS  Visit# / total visits:    per POC  Pain level:  1.25/10   Goals:     Patient goals : return to work or previous function. Short term goals  Time Frame for Short term goals: 6 weeks 10/16/21  Short term goal 1: Pt will demo appropriate transition with WB onto LE with brace as able. MET  Short term goal 2: Pt will demo full quad set with WB onto L LE without increase in pain. MET  Short term goal 3: Pt will demo  >110 deg knee AROM with no increase in pain to return to normal mechanics. MET   Short term goal 4: Pt will demo nomal gait mechanics with/without brace to ease functional mobility. MET  Short term goal 5: Pt will demo LEFS>50/80  to dmeo improved function. Long term goals  Time Frame for Long term goals : 12 weeks 21  Long term goal 1: Pt will demo I with HEP/symptom management. Long term goal 2: Pt will demo full knee AROM with no increase in pain to ease transfers. Long term goal 3: Pt will demo initiate jogging with normal mechanics with min/no pain. Long term goal 4: Pt will demo 5/5 knee flex/ext strength to ease stairs. Long term goal 5: Pt will demo LEFS >70/80 per LEFS to demo improved function.     Summary of Evaluation:     Pt is 31 year old male s/p L knee ACL reconstructions with tibialis anterior allograft  8/11/21. Pt now has difficulties completing all general mobility, transfers and inability to complete closed chain activities including stairs and prolonged weightbearing activities without pain. Pt demo deficits this date that include pain, quad activation, flexibility restrictions and weakness.  Issued handout for exercises to complete at home. Pt will benefit with PT services with strength/ROM, gait training, manual and modalities to maximize function. Pt prior to onset of current condition had no pain with able to complete full ADLs and sports. Patient received education on their current pathology and how their condition effects them with their functional activities. Patient understood discussion and questions were answered. Patient understands their activity limitations and understands rational for treatment progression. Subjective:  Pt states he was a little sore after previous session but overall felt he did well with new exercises. Any changes in Ambulatory Summary Sheet?   None      Objective:     COVID screening questions were asked and patient attested that there had been no contact or symptoms  Normal gait without brace  Full knee AROM   Challenged with uneven surfaces with ankle reaction to recover for balance  Good technique with step ups with anterior knee discomfort.        Exercises: (No more than 4 columns) WBAT  Exercise/Equipment Date 9/21/21 #4 9/28/21 #5 10/5/21  #6 10/7/21 #7             WARM UP          Nu step  WL 3 x 5 min L4 x5'      R bike    5'  5'   TABLE       *Quad sets With quad stim x 10 min X10, 5\"     *Ankle pumps        *Sitting AAROM knee flexion stretch with use of R LE        *Heel prop  x 1 min      *gastroc stretching        heelslides with SB 2x10  10x2  GSB 10x2 GSB 10x2   SLR 3x10 2x10 6# 10x2 6# 10x2   sidelying hip abd 2x10 2x10     Prone hip ext 2x10 2x10     Prone quad stretch (1) vaso        Next Progress Note due:  Eval 9/2/21  Visit 10         Plan of Care Interventions:  [x]? Therapeutic Exercise                     [x]? Modalities:  [x]? Therapeutic Activity                                   []? Ultrasound              [x]? Estim  [x]? Gait Training                                              []? Cervical Traction    []? Lumbar Traction  [x]? Neuromuscular Re-education                    [x]? Cold/hotpack          []? Iontophoresis           [x]? Instruction in HEP                                     [x]? Vasopneumatic      []? Dry Needling    [x]?  Manual Therapy                                                  []? Aquatic Therapy                              Electronically signed by: Tree Giles PTA  10/7/2021, 2:34 PM     10/7/2021 2:34 PM

## 2021-10-14 ENCOUNTER — HOSPITAL ENCOUNTER (OUTPATIENT)
Dept: PHYSICAL THERAPY | Age: 31
Setting detail: THERAPIES SERIES
Discharge: HOME | End: 2021-10-14
Payer: COMMERCIAL

## 2021-10-14 PROCEDURE — 97110 THERAPEUTIC EXERCISES: CPT

## 2021-10-14 PROCEDURE — 97112 NEUROMUSCULAR REEDUCATION: CPT

## 2021-10-14 NOTE — FLOWSHEET NOTE
Outpatient Physical Therapy  [place]           [x] Phone: [phone]   Fax: [phone]  [place]           [] Phone: [phone]   Fax: [phone]        Physical Therapy Daily Treatment Note  Date:  10/14/2021    Patient Name:  Dayana Calvo    :  1990  MRN: [id number]  Restrictions/Precautions:   WBAT  Diagnosis:   Diagnosis: L ACL repair with tibialis anterior allograft     Date of Injury/Surgery: 21  Treatment Diagnosis: Treatment Diagnosis: L knee pain, weakness, stiffness, gait dysfunction. Insurance/Certification information: PT Insurance Information: UMR   Referring Physician:  Referring Practitioner: Dr. Taisha Landers  Next Doctor Visit:    Plan of care signed (Y/N):  Yes   Outcome Measure: LEFS  Visit# / total visits:    per POC  Pain level:  0 /10   Goals:     Patient goals : return to work or previous function. Short term goals  Time Frame for Short term goals: 6 weeks 10/16/21  Short term goal 1: Pt will demo appropriate transition with WB onto LE with brace as able. MET  Short term goal 2: Pt will demo full quad set with WB onto L LE without increase in pain. MET  Short term goal 3: Pt will demo  >110 deg knee AROM with no increase in pain to return to normal mechanics. MET   Short term goal 4: Pt will demo nomal gait mechanics with/without brace to ease functional mobility. MET  Short term goal 5: Pt will demo LEFS>50/80  to dmeo improved function. Long term goals  Time Frame for Long term goals : 12 weeks 21  Long term goal 1: Pt will demo I with HEP/symptom management. Long term goal 2: Pt will demo full knee AROM with no increase in pain to ease transfers. Long term goal 3: Pt will demo initiate jogging with normal mechanics with min/no pain. Long term goal 4: Pt will demo 5/5 knee flex/ext strength to ease stairs. Long term goal 5: Pt will demo LEFS >70/80 per LEFS to demo improved function.     Summary of Evaluation:     Pt is 31 year old male s/p L knee ACL reconstructions with tibialis anterior allograft  8/11/21. Pt now has difficulties completing all general mobility, transfers and inability to complete closed chain activities including stairs and prolonged weightbearing activities without pain. Pt demo deficits this date that include pain, quad activation, flexibility restrictions and weakness.  Issued handout for exercises to complete at home. Pt will benefit with PT services with strength/ROM, gait training, manual and modalities to maximize function. Pt prior to onset of current condition had no pain with able to complete full ADLs and sports. Patient received education on their current pathology and how their condition effects them with their functional activities. Patient understood discussion and questions were answered. Patient understands their activity limitations and understands rational for treatment progression. Subjective:  Pt states he is doing fine. No issues after last visit. Did push mow the yard with 4-5/10 pain after 1.5 hour mow with improvement the next day. Any changes in Ambulatory Summary Sheet?   None      Objective:     COVID screening questions were asked and patient attested that there had been no contact or symptoms  Normal gait mechanics  Full knee AROM   Challenged with uneven surfaces with ankle reaction to recover for balance  Weakness with eccentric lowering with descending steps, very min weakness with ascending steps        Exercises: (No more than 4 columns) WBAT  Exercise/Equipment 10/5/21  #6 10/7/21 #7 10/14/21  #8            WARM UP         Nu step        R bike  5'  5' 5'    TABLE      *Quad sets      *Ankle pumps       *Sitting AAROM knee flexion stretch with use of R LE       *Heel prop      *gastroc stretching       heelslides with SB 10x2  GSB 10x2 GSB 10x2 GSB 10x2   SLR 6# 10x2 6# 10x2    sidelying hip abd      Prone hip ext      Prone quad stretch    30\"x3   Bridge with hamstring curl GSB 10x2  GSB 10x2 GSB 10x2   Stool Drags  B LE 10x2 10x2    STANDING      L LE hip ext/ABD      Mini lunge/squats       heelraises       gait training quad cane       TKE      Shuttle Leg Press L LE 2c 10, 3c 10x2 3c 10x2 DL - mostly pushing with LLE L LE 2c 10x  3c 10x  4c 10x    step ups 6in 20x2  Lateral 6\" 20x2         FM bwd stepping  23# 5x 23# 5x 27#  5x2                     PROPRIOCEPTION      wobbleboard 20\"x3 20\"x3 15x2     L SLS on BOSU 10\"x4 10\"x4     BOSU marches  30x2 30x2    Perturbations/EC on BOSU   20\"x2     Min squat on BOSU 10x2  10x2 10x2    lateral step over BOSU   10x2   MODALITIES      Vaso  10' 10' --                Other Therapeutic Activities/Education:  HEP, nonreciprocal pattern descending stairs to reduce potential buckling or injury     Home Exercise Program:  HO issued, reviewed and discussed with patient. Pt agreed to comply. 9/7/21 SLR, quad set, mini squat, Standing hip abd/ext     Manual Treatments:  none     Modalities:  Gameready to L knee in supine, low pressure, 34 deg x10' for pain management. No adverse effects.         Communication with other providers:  POC sent 9/2/21     Assessment:  Pt tolerated today's treatment without any adverse reactions or complications this date. Doing well with all activities. Progressing well towards goals. Will return to work next week. Will return in 2-3 weeks with anticipated discharge at that visit. Will continue to benefit from skilled therapy interventions to address remaining impairments, improve mobility and strength and progress toward goal completion while reducing risk for re-injury or further decline. End pain: 1/10     Plan for Next Session: Specific instructions for Next Treatment:  Closed chain quad activation, neuro activities as tolerated, gameready.  See protocol.        Time In / Time Out:  2237-1971        Timed Code/Total Treatment Minutes:  43/43'    , (2) 28'  TE, (1)   15' NR,          Next Progress Note due: Eval 9/2/21  Visit 10         Plan of Care Interventions:  [x]? Therapeutic Exercise                     [x]? Modalities:  [x]? Therapeutic Activity                                   []? Ultrasound              [x]? Estim  [x]? Gait Training                                              []? Cervical Traction    []? Lumbar Traction  [x]? Neuromuscular Re-education                    [x]? Cold/hotpack          []? Iontophoresis           [x]? Instruction in HEP                                     [x]? Vasopneumatic      []? Dry Needling    [x]?  Manual Therapy                                                  []? Aquatic Therapy                              Electronically signed by: Keke Riley, PT DPT, OCS   10/14/2021, 6:31 AM       10/14/2021 6:33 AM

## 2021-10-19 ENCOUNTER — HOSPITAL ENCOUNTER (OUTPATIENT)
Dept: PHYSICAL THERAPY | Age: 31
Setting detail: THERAPIES SERIES
Discharge: HOME | End: 2021-10-19
Payer: COMMERCIAL

## 2021-10-19 PROCEDURE — 97530 THERAPEUTIC ACTIVITIES: CPT

## 2021-10-19 PROCEDURE — 97110 THERAPEUTIC EXERCISES: CPT

## 2021-10-19 NOTE — FLOWSHEET NOTE
Outpatient Physical Therapy  [place]           [x] Phone: [phone]   Fax: [phone]  [place]           [] Phone: [phone]   Fax: [phone]        Physical Therapy Daily Treatment Note  Date:  10/19/2021    Patient Name:  Dayana Calvo    :  1990  MRN: [id number]  Restrictions/Precautions:   WBAT  Diagnosis:   Diagnosis: L ACL repair with tibialis anterior allograft     Date of Injury/Surgery: 21  Treatment Diagnosis: Treatment Diagnosis: L knee pain, weakness, stiffness, gait dysfunction. Insurance/Certification information: PT Insurance Information: UMR   Referring Physician:  Referring Practitioner: Dr. Taisha Landers  Next Doctor Visit:    Plan of care signed (Y/N):  Yes   Outcome Measure: LEFS  Visit# / total visits:    per POC  Pain level:  3 /10   Goals:     Patient goals : return to work or previous function. Short term goals  Time Frame for Short term goals: 6 weeks 10/16/21  Short term goal 1: Pt will demo appropriate transition with WB onto LE with brace as able. MET  Short term goal 2: Pt will demo full quad set with WB onto L LE without increase in pain. MET  Short term goal 3: Pt will demo  >110 deg knee AROM with no increase in pain to return to normal mechanics. MET   Short term goal 4: Pt will demo nomal gait mechanics with/without brace to ease functional mobility. MET  Short term goal 5: Pt will demo LEFS>50/80  to dmeo improved function. Mostly MET   Long term goals  Time Frame for Long term goals : 12 weeks 21  Long term goal 1: Pt will demo I with HEP/symptom management. MET  Long term goal 2: Pt will demo full knee AROM with no increase in pain to ease transfers. MET   Long term goal 3: Pt will demo initiate jogging with normal mechanics with min/no pain. Long term goal 4: Pt will demo 5/5 knee flex/ext strength to ease stairs. Partially MET   Long term goal 5: Pt will demo LEFS >70/80 per LEFS to demo improved function.  Not MET Summary of Evaluation:     Pt is 33year old male s/p L knee ACL reconstructions with tibialis anterior allograft  8/11/21. Pt now has difficulties completing all general mobility, transfers and inability to complete closed chain activities including stairs and prolonged weightbearing activities without pain. Pt demo deficits this date that include pain, quad activation, flexibility restrictions and weakness.  Issued handout for exercises to complete at home. Pt will benefit with PT services with strength/ROM, gait training, manual and modalities to maximize function. Pt prior to onset of current condition had no pain with able to complete full ADLs and sports. Patient received education on their current pathology and how their condition effects them with their functional activities. Patient understood discussion and questions were answered. Patient understands their activity limitations and understands rational for treatment progression. Subjective:  Pt states helped moving a washer this past weekend  With increase in pain 2 days a ago, otherwise without pain. Return to work next week with gradual return to work and completing PT there. Feels 70-75% max function. Complweting all activities at home. Any changes in Ambulatory Summary Sheet? None      Objective:     COVID screening questions were asked and patient attested that there had been no contact or symptoms    Normal gait mechanics  Lack eccentric control with descending stairs, ascend appropriately. Full knee AROM   5x sit to stand: 18.16 sec without UE assistance. L SLS: >40 sec  Without increase in pain. 3/4 squat with lateral knee pain. 4/5 L hip flex/knee flex/ext    0-135 deg knee ext/flex ROM   6MWT: 1430 ft without rest break or increase in pain.    LEFS: 41/80 full function        Exercises: (No more than 4 columns) WBAT  Exercise/Equipment 10/5/21  #6 10/7/21 #7 10/14/21  #8 10/19/21  #9             WARM UP          Nu step program. Will be discharged with home program at this time. Pt agrees to this plan. End pain: 2-3/10      Plan for Next Session: Specific instructions for Next Treatment:  Closed chain quad activation, neuro activities as tolerated, gameready. See protocol.        Time In / Time Out:  7451-1476       Timed Code/Total Treatment Minutes:  42/42'    , (2) 27'  TE,   (1)   15'TA        Next Progress Note due:  Eval 9/2/21  Visit 10         Plan of Care Interventions:  [x]? Therapeutic Exercise                     [x]? Modalities:  [x]? Therapeutic Activity                                   []? Ultrasound              [x]? Estim  [x]? Gait Training                                              []? Cervical Traction    []? Lumbar Traction  [x]? Neuromuscular Re-education                    [x]? Cold/hotpack          []? Iontophoresis           [x]? Instruction in HEP                                     [x]? Vasopneumatic      []? Dry Needling    [x]?  Manual Therapy                                                  []? Aquatic Therapy                              Electronically signed by: Yovany Lilly PT DPT, OCS   10/19/2021, 7:41 AM       10/19/2021 7:41 AM

## 2021-10-19 NOTE — DISCHARGE SUMMARY
Outpatient Physical Therapy           [place]           [] Phone: [phone]   Fax: [phone]  [place]           [] Phone: [phone]   Fax: [phone]      To:  Dr. Levar Jerry         From: Sherrlyn Olszewski, PT, DPT, OCS    Patient: Andrea Willson                     : 1990  Diagnosis:     L ACL repair with tibialis anterior allograft    21   Date: 10/19/2021  Treatment Diagnosis:   L knee pain, weakness, stiffness, gait dysfunction      []  Progress Note                [x]  Discharge Note    Evaluation Date:  21   Total Visits to date:   9 Cancels/No-shows to date:  3    Subjective:  Pt states helped moving a washer this past weekend  With increase in pain 2 days a ago, otherwise without pain. Return to work next week with gradual return to work and completing PT there. Feels 70-75% max function. Complweting all activities at home. Plan of Care/Treatment to date:  [x] Therapeutic Exercise    [x] Modalities:  [x] Therapeutic Activity     [] Ultrasound  [x] Electrical Stimulation  [x] Gait Training      [] Cervical Traction   [] Lumbar Traction  [x] Neuromuscular Re-education  [x] Cold/hotpack [] Iontophoresis  [x] Instruction in HEP      Other:  [x] Manual Therapy       [x]  Vasopneumatic  [] Aquatic Therapy       []   Dry Needle Therapy                      Objective/Significant Findings At Last Visit/Comments:    Normal gait mechanics  Lack eccentric control with descending stairs, ascend appropriately. Full knee AROM   5x sit to stand: 18.16 sec without UE assistance. L SLS: >40 sec  Without increase in pain. 3/4 squat with lateral knee pain. 4/5 L hip flex/knee flex/ext    0-135 deg knee ext/flex ROM   6MWT: 1430 ft without rest break or increase in pain. LEFS: 41/80 full function        Assessment:   Pt has completed 9 visits since start of therapy on 21. Pt has increased ease completing transfers and general mobility.  Pt remains with deficits in weakness into L LE and min-mod pain at this time. Now has full AR OM and normal gait mechanics. Will transition care to PT at [facility] with return to work program. Will be discharged with home program at this time. Pt agrees to this plan. Goal Status:  [x] Achieved [x] Partially Achieved  [] Not Achieved       Patient goals : return to work or previous function. Short term goals  Time Frame for Short term goals: 6 weeks 10/16/21  Short term goal 1: Pt will demo appropriate transition with WB onto LE with brace as able. MET  Short term goal 2: Pt will demo full quad set with WB onto L LE without increase in pain. MET  Short term goal 3: Pt will demo  >110 deg knee AROM with no increase in pain to return to normal mechanics. MET   Short term goal 4: Pt will demo nomal gait mechanics with/without brace to ease functional mobility. MET  Short term goal 5: Pt will demo LEFS>50/80  to dmeo improved function. Mostly MET   Long term goals  Time Frame for Long term goals : 12 weeks 12/2/21  Long term goal 1: Pt will demo I with HEP/symptom management. MET  Long term goal 2: Pt will demo full knee AROM with no increase in pain to ease transfers. MET   Long term goal 3: Pt will demo initiate jogging with normal mechanics with min/no pain. Long term goal 4: Pt will demo 5/5 knee flex/ext strength to ease stairs. Partially MET   Long term goal 5: Pt will demo LEFS >70/80 per LEFS to demo improved function. Not MET                  Patient Status: [] Continue per initial plan of Care     [x] Patient now discharged     [] Additional visits requested, Please re-certify for additional visits: If we are requesting more visits, we fully anticipate the patient's condition is expected to improve within the treatment timeframe we are requesting. Electronically signed by:  Radha Montaño, PT, DPT, OCS  10/19/2021, 7:42 AM    10/19/2021 12:57 PM     If you have any questions or concerns, please don't hesitate to call.   Thank you for your referral.    Physician Signature:______________________ Date:______ Time: ________  By signing above, therapists plan is approved by physician